# Patient Record
Sex: FEMALE | Race: WHITE | NOT HISPANIC OR LATINO | Employment: OTHER | URBAN - NONMETROPOLITAN AREA
[De-identification: names, ages, dates, MRNs, and addresses within clinical notes are randomized per-mention and may not be internally consistent; named-entity substitution may affect disease eponyms.]

---

## 2021-06-03 ENCOUNTER — HOSPITAL ENCOUNTER (EMERGENCY)
Facility: HOSPITAL | Age: 81
Discharge: HOME | End: 2021-06-03
Attending: EMERGENCY MEDICINE | Admitting: EMERGENCY MEDICINE
Payer: COMMERCIAL

## 2021-06-03 ENCOUNTER — APPOINTMENT (EMERGENCY)
Dept: CT IMAGING | Facility: HOSPITAL | Age: 81
End: 2021-06-03
Payer: COMMERCIAL

## 2021-06-03 VITALS
DIASTOLIC BLOOD PRESSURE: 79 MMHG | TEMPERATURE: 97.5 F | SYSTOLIC BLOOD PRESSURE: 172 MMHG | WEIGHT: 179.68 LBS | HEART RATE: 60 BPM | RESPIRATION RATE: 30 BRPM | OXYGEN SATURATION: 96 %

## 2021-06-03 DIAGNOSIS — R42 VERTIGO: Primary | ICD-10-CM

## 2021-06-03 LAB
ALBUMIN SERPL BCP-MCNC: 3.1 G/DL (ref 3.5–5)
ALP SERPL-CCNC: 84 U/L (ref 46–116)
ALT SERPL W P-5'-P-CCNC: 25 U/L (ref 12–78)
ANION GAP SERPL CALCULATED.3IONS-SCNC: 5 MMOL/L (ref 4–13)
APTT PPP: 44 SECONDS (ref 23–37)
AST SERPL W P-5'-P-CCNC: 12 U/L (ref 5–45)
BASOPHILS # BLD AUTO: 0.04 THOUSANDS/ΜL (ref 0–0.1)
BASOPHILS NFR BLD AUTO: 1 % (ref 0–1)
BILIRUB SERPL-MCNC: 0.68 MG/DL (ref 0.2–1)
BUN SERPL-MCNC: 16 MG/DL (ref 5–25)
CALCIUM ALBUM COR SERPL-MCNC: 9.3 MG/DL (ref 8.3–10.1)
CALCIUM SERPL-MCNC: 8.6 MG/DL (ref 8.3–10.1)
CHLORIDE SERPL-SCNC: 106 MMOL/L (ref 100–108)
CO2 SERPL-SCNC: 27 MMOL/L (ref 21–32)
CREAT SERPL-MCNC: 0.87 MG/DL (ref 0.6–1.3)
EOSINOPHIL # BLD AUTO: 0.33 THOUSAND/ΜL (ref 0–0.61)
EOSINOPHIL NFR BLD AUTO: 6 % (ref 0–6)
ERYTHROCYTE [DISTWIDTH] IN BLOOD BY AUTOMATED COUNT: 13.2 % (ref 11.6–15.1)
GFR SERPL CREATININE-BSD FRML MDRD: 64 ML/MIN/1.73SQ M
GLUCOSE SERPL-MCNC: 99 MG/DL (ref 65–140)
HCT VFR BLD AUTO: 40.5 % (ref 34.8–46.1)
HGB BLD-MCNC: 13.7 G/DL (ref 11.5–15.4)
IMM GRANULOCYTES # BLD AUTO: 0.02 THOUSAND/UL (ref 0–0.2)
IMM GRANULOCYTES NFR BLD AUTO: 0 % (ref 0–2)
INR PPP: 2.89 (ref 0.84–1.19)
LACTATE SERPL-SCNC: 1.1 MMOL/L (ref 0.5–2)
LIPASE SERPL-CCNC: 100 U/L (ref 73–393)
LYMPHOCYTES # BLD AUTO: 1.4 THOUSANDS/ΜL (ref 0.6–4.47)
LYMPHOCYTES NFR BLD AUTO: 24 % (ref 14–44)
MCH RBC QN AUTO: 30.9 PG (ref 26.8–34.3)
MCHC RBC AUTO-ENTMCNC: 33.8 G/DL (ref 31.4–37.4)
MCV RBC AUTO: 91 FL (ref 82–98)
MONOCYTES # BLD AUTO: 0.64 THOUSAND/ΜL (ref 0.17–1.22)
MONOCYTES NFR BLD AUTO: 11 % (ref 4–12)
NEUTROPHILS # BLD AUTO: 3.3 THOUSANDS/ΜL (ref 1.85–7.62)
NEUTS SEG NFR BLD AUTO: 58 % (ref 43–75)
NRBC BLD AUTO-RTO: 0 /100 WBCS
PLATELET # BLD AUTO: 308 THOUSANDS/UL (ref 149–390)
PMV BLD AUTO: 8.7 FL (ref 8.9–12.7)
POTASSIUM SERPL-SCNC: 4.1 MMOL/L (ref 3.5–5.3)
PROT SERPL-MCNC: 6.6 G/DL (ref 6.4–8.2)
PROTHROMBIN TIME: 29.6 SECONDS (ref 11.6–14.5)
RBC # BLD AUTO: 4.43 MILLION/UL (ref 3.81–5.12)
SARS-COV-2 RNA RESP QL NAA+PROBE: NEGATIVE
SODIUM SERPL-SCNC: 138 MMOL/L (ref 136–145)
TROPONIN I SERPL-MCNC: <0.02 NG/ML
WBC # BLD AUTO: 5.73 THOUSAND/UL (ref 4.31–10.16)

## 2021-06-03 PROCEDURE — 99284 EMERGENCY DEPT VISIT MOD MDM: CPT

## 2021-06-03 PROCEDURE — 93005 ELECTROCARDIOGRAM TRACING: CPT

## 2021-06-03 PROCEDURE — 96360 HYDRATION IV INFUSION INIT: CPT

## 2021-06-03 PROCEDURE — 70450 CT HEAD/BRAIN W/O DYE: CPT

## 2021-06-03 PROCEDURE — 85730 THROMBOPLASTIN TIME PARTIAL: CPT | Performed by: EMERGENCY MEDICINE

## 2021-06-03 PROCEDURE — 36415 COLL VENOUS BLD VENIPUNCTURE: CPT | Performed by: EMERGENCY MEDICINE

## 2021-06-03 PROCEDURE — U0003 INFECTIOUS AGENT DETECTION BY NUCLEIC ACID (DNA OR RNA); SEVERE ACUTE RESPIRATORY SYNDROME CORONAVIRUS 2 (SARS-COV-2) (CORONAVIRUS DISEASE [COVID-19]), AMPLIFIED PROBE TECHNIQUE, MAKING USE OF HIGH THROUGHPUT TECHNOLOGIES AS DESCRIBED BY CMS-2020-01-R: HCPCS | Performed by: EMERGENCY MEDICINE

## 2021-06-03 PROCEDURE — 99285 EMERGENCY DEPT VISIT HI MDM: CPT | Performed by: EMERGENCY MEDICINE

## 2021-06-03 PROCEDURE — 83690 ASSAY OF LIPASE: CPT | Performed by: EMERGENCY MEDICINE

## 2021-06-03 PROCEDURE — 83605 ASSAY OF LACTIC ACID: CPT | Performed by: EMERGENCY MEDICINE

## 2021-06-03 PROCEDURE — U0005 INFEC AGEN DETEC AMPLI PROBE: HCPCS | Performed by: EMERGENCY MEDICINE

## 2021-06-03 PROCEDURE — 85025 COMPLETE CBC W/AUTO DIFF WBC: CPT | Performed by: EMERGENCY MEDICINE

## 2021-06-03 PROCEDURE — 84484 ASSAY OF TROPONIN QUANT: CPT | Performed by: EMERGENCY MEDICINE

## 2021-06-03 PROCEDURE — 85610 PROTHROMBIN TIME: CPT | Performed by: EMERGENCY MEDICINE

## 2021-06-03 PROCEDURE — 80053 COMPREHEN METABOLIC PANEL: CPT | Performed by: EMERGENCY MEDICINE

## 2021-06-03 RX ORDER — ONDANSETRON 2 MG/ML
4 INJECTION INTRAMUSCULAR; INTRAVENOUS ONCE
Status: DISCONTINUED | OUTPATIENT
Start: 2021-06-03 | End: 2021-06-03 | Stop reason: HOSPADM

## 2021-06-03 RX ORDER — LEVOTHYROXINE SODIUM 0.05 MG/1
50 TABLET ORAL DAILY
COMMUNITY

## 2021-06-03 RX ORDER — MECLIZINE HYDROCHLORIDE 25 MG/1
25 TABLET ORAL 3 TIMES DAILY PRN
Qty: 30 TABLET | Refills: 0 | Status: SHIPPED | OUTPATIENT
Start: 2021-06-03

## 2021-06-03 RX ORDER — CEFUROXIME AXETIL 500 MG/1
500 TABLET ORAL EVERY 12 HOURS SCHEDULED
Qty: 14 TABLET | Refills: 0 | Status: SHIPPED | OUTPATIENT
Start: 2021-06-03 | End: 2021-06-10

## 2021-06-03 RX ADMIN — SODIUM CHLORIDE 500 ML: 0.9 INJECTION, SOLUTION INTRAVENOUS at 16:13

## 2021-06-03 NOTE — ED PROVIDER NOTES
History  Chief Complaint   Patient presents with    Dizziness     Patient with dizziness and nausea x3 days  C/O diarrhea and "runny nose "       Patient for the past 3 days complains of dizziness and nausea  States it occurs at night when she lays in bed and turns it during bed  States she gets dizzy  Slightly nauseated  Has had cold symptoms recently  Has had diarrhea  Received both COVID vaccine shots in April  Called her PCP today he referred to the ED for further evaluation  Told her she had COVID symptoms  No fevers or chills  No rash  No shortness of breath  No vision changes  No change in speech  No focal weakness or numbness  History provided by:  Patient   used: No    Dizziness  Quality:  Vertigo  Severity:  Mild  Onset quality:  Sudden  Duration:  3 days  Timing:  Intermittent  Progression:  Unchanged  Chronicity:  New  Context comment:  Occurs with turning in bed  Relieved by:  Being still  Exacerbated by: Turning in bed  Ineffective treatments:  None tried  Associated symptoms: no blood in stool, no chest pain, no diarrhea, no headaches, no hearing loss, no nausea, no palpitations, no shortness of breath, no vision changes and no vomiting        Prior to Admission Medications   Prescriptions Last Dose Informant Patient Reported? Taking? Warfarin Sodium (COUMADIN PO)   Yes Yes   Sig: Take by mouth   levothyroxine 50 mcg tablet   Yes Yes   Sig: Take 50 mcg by mouth daily   metoprolol tartrate (LOPRESSOR) 25 mg tablet   Yes Yes   Sig: Take 25 mg by mouth daily      Facility-Administered Medications: None       Past Medical History:   Diagnosis Date    Anxiety     Hypercholesteremia     [facility]anoma ([facility] )     MI (myocardial infarction) ([facility] )     Stroke [facility])        Past Surgical History:   Procedure Laterality Date    THYROIDECTOMY         History reviewed  No pertinent family history    I have reviewed and agree with the history as documented  E-Cigarette/Vaping    E-Cigarette Use Never User      E-Cigarette/Vaping Substances     Social History     Tobacco Use    Smoking status: Never Smoker    Smokeless tobacco: Never Used   Substance Use Topics    Alcohol use: Not Currently    Drug use: Never       Review of Systems   Constitutional: Negative for chills and fever  HENT: Negative for ear pain, hearing loss, sore throat, trouble swallowing and voice change  Eyes: Negative for pain and discharge  Respiratory: Negative for cough, shortness of breath and wheezing  Cardiovascular: Negative for chest pain and palpitations  Gastrointestinal: Negative for abdominal pain, blood in stool, constipation, diarrhea, nausea and vomiting  Genitourinary: Negative for dysuria, flank pain, frequency and hematuria  Musculoskeletal: Negative for joint swelling, neck pain and neck stiffness  Skin: Negative for rash and wound  Neurological: Positive for dizziness  Negative for seizures, syncope, facial asymmetry and headaches  Psychiatric/Behavioral: Negative for hallucinations, self-injury and suicidal ideas  All other systems reviewed and are negative  Physical Exam  Physical Exam  Vitals signs and nursing note reviewed  Constitutional:       General: She is not in acute distress  Appearance: She is well-developed  HENT:      Head: Normocephalic and atraumatic  Right Ear: External ear normal       Left Ear: External ear normal    Eyes:      Conjunctiva/sclera: Conjunctivae normal       Pupils: Pupils are equal, round, and reactive to light  Neck:      Musculoskeletal: Normal range of motion and neck supple  Cardiovascular:      Rate and Rhythm: Normal rate and regular rhythm  Heart sounds: Normal heart sounds  No murmur  Pulmonary:      Effort: Pulmonary effort is normal       Breath sounds: Normal breath sounds  No wheezing or rales     Abdominal:      General: Bowel sounds are normal  There is no distension  Palpations: Abdomen is soft  Tenderness: There is no abdominal tenderness  There is no guarding or rebound  Musculoskeletal: Normal range of motion  General: No deformity  Skin:     General: Skin is warm and dry  Findings: No rash  Neurological:      General: No focal deficit present  Mental Status: She is alert and oriented to person, place, and time  Cranial Nerves: No cranial nerve deficit  Psychiatric:         Behavior: Behavior normal          Vital Signs  ED Triage Vitals [06/03/21 1545]   Temperature Pulse Respirations Blood Pressure SpO2   97 5 °F (36 4 °C) 70 18 162/82 96 %      Temp Source Heart Rate Source Patient Position - Orthostatic VS BP Location FiO2 (%)   Temporal Monitor Sitting Right arm --      Pain Score       --           Vitals:    06/03/21 1545   BP: 162/82   Pulse: 70   Patient Position - Orthostatic VS: Sitting         Visual Acuity      ED Medications  Medications   ondansetron (ZOFRAN) injection 4 mg (has no administration in time range)   sodium chloride 0 9 % bolus 500 mL (500 mL Intravenous New Bag 6/3/21 1613)       Diagnostic Studies  Results Reviewed     Procedure Component Value Units Date/Time    Novel Coronavirus (Covid-19),PCR [facility] - 2 Hour Stat [[id number]]  (Normal) Collected: 06/03/21 1611    Lab Status: Final result Specimen: Nares from Nose Updated: 06/03/21 1714     SARS-CoV-2 Negative    Narrative: The specimen collection materials, transport medium, and/or testing methodology utilized in the production of these test results have been proven to be reliable in a limited validation with an abbreviated program under the Emergency Utilization Authorization provided by the FDA  Testing reported as "Presumptive positive" will be confirmed with secondary testing to ensure result accuracy    Clinical caution and judgement should be used with the interpretation of these results with consideration of the clinical impression and other laboratory testing  Testing reported as "Positive" or "Negative" has been proven to be accurate according to standard laboratory validation requirements  All testing is performed with control materials showing appropriate reactivity at standard intervals  Lactic acid [[id number]]  (Normal) Collected: 06/03/21 1611    Lab Status: Final result Specimen: Blood from Arm, Right Updated: 06/03/21 1646     LACTIC ACID 1 1 mmol/L     Narrative:      Result may be elevated if tourniquet was used during collection      Comprehensive metabolic panel [[id number]]  (Abnormal) Collected: 06/03/21 1611    Lab Status: Final result Specimen: Blood from Arm, Right Updated: 06/03/21 1640     Sodium 138 mmol/L      Potassium 4 1 mmol/L      Chloride 106 mmol/L      CO2 27 mmol/L      ANION GAP 5 mmol/L      BUN 16 mg/dL      Creatinine 0 87 mg/dL      Glucose 99 mg/dL      Calcium 8 6 mg/dL      Corrected Calcium 9 3 mg/dL      AST 12 U/L      ALT 25 U/L      Alkaline Phosphatase 84 U/L      Total Protein 6 6 g/dL      Albumin 3 1 g/dL      Total Bilirubin 0 68 mg/dL      eGFR 64 ml/min/1 73sq m     Narrative:      Alycia guidelines for Chronic Kidney Disease (CKD):     Stage 1 with normal or high GFR (GFR > 90 mL/min/1 73 square meters)    Stage 2 Mild CKD (GFR = 60-89 mL/min/1 73 square meters)    Stage 3A Moderate CKD (GFR = 45-59 mL/min/1 73 square meters)    Stage 3B Moderate CKD (GFR = 30-44 mL/min/1 73 square meters)    Stage 4 Severe CKD (GFR = 15-29 mL/min/1 73 square meters)    Stage 5 End Stage CKD (GFR <15 mL/min/1 73 square meters)  Note: GFR calculation is accurate only with a steady state creatinine    Lipase [[id number]]  (Normal) Collected: 06/03/21 1611    Lab Status: Final result Specimen: Blood from Arm, Right Updated: 06/03/21 1640     Lipase 100 u/L     Troponin I [[id number]]  (Normal) Collected: 06/03/21 1611    Lab Status: Final result Specimen: Blood from Arm, Right Updated: 06/03/21 1640     Troponin I <0 02 ng/mL     Protime-INR [[id number]]  (Abnormal) Collected: 06/03/21 1611    Lab Status: Final result Specimen: Blood from Arm, Right Updated: 06/03/21 1638     Protime 29 6 seconds      INR 2 89    APTT [[id number]]  (Abnormal) Collected: 06/03/21 1611    Lab Status: Final result Specimen: Blood from Arm, Right Updated: 06/03/21 1638     PTT 44 seconds     CBC and differential [[id number]]  (Abnormal) Collected: 06/03/21 1611    Lab Status: Final result Specimen: Blood from Arm, Right Updated: 06/03/21 1622     WBC 5 73 Thousand/uL      RBC 4 43 Million/uL      Hemoglobin 13 7 g/dL      Hematocrit 40 5 %      MCV 91 fL      MCH 30 9 pg      MCHC 33 8 g/dL      RDW 13 2 %      MPV 8 7 fL      Platelets 342 Thousands/uL      nRBC 0 /100 WBCs      Neutrophils Relative 58 %      Immat GRANS % 0 %      Lymphocytes Relative 24 %      Monocytes Relative 11 %      Eosinophils Relative 6 %      Basophils Relative 1 %      Neutrophils Absolute 3 30 Thousands/µL      Immature Grans Absolute 0 02 Thousand/uL      Lymphocytes Absolute 1 40 Thousands/µL      Monocytes Absolute 0 64 Thousand/µL      Eosinophils Absolute 0 33 Thousand/µL      Basophils Absolute 0 04 Thousands/µL                  CT head without contrast   Final Result by Starr Luis MD (06/03 1724)      No acute intracranial abnormality                    Workstation performed: [id number]                    Procedures  ECG 12 Lead Documentation Only    Date/Time: 6/3/2021 5:28 PM  Performed by: Hima Alexander MD  Authorized by: Hima Alexander MD     ECG reviewed by me, the ED Provider: yes    Patient location:  ED  Previous ECG:     Previous ECG:  Unavailable  Rate:     ECG rate:  60  Rhythm:     Rhythm: sinus rhythm    Ectopy:     Ectopy: none    QRS:     QRS axis:  Left             ED Course                                           MDM  Number of Diagnoses or Management Options  Vertigo:   Diagnosis management comments: Neurologic exam is benign  CT scan is normal   Symptoms are reproducible when she turns in bed  Most likely vertigo  Will cover with antibiotics just in case this is an early your infection  White blood cell count is normal   No change in mental status  No weakness or numbness in arm or leg  Amount and/or Complexity of Data Reviewed  Clinical lab tests: reviewed  Review and summarize past medical records: yes        Disposition  Final diagnoses:   Vertigo     Time reflects when diagnosis was documented in both MDM as applicable and the Disposition within this note     Time User Action Codes Description Comment    6/3/2021  4:44 PM Tess Marti Add [R42] Vertigo       ED Disposition     ED Disposition Condition Date/Time Comment    Discharge Stable Thu Drew 3, 2021  4:44 PM Ada Ricardo Parulis discharge to home/self care  Follow-up Information     Follow up With Specialties Details Why [address], [place] Family Medicine Call in 1 week As needed [address] [place] [address]  [phone]            Patient's Medications   Discharge Prescriptions    CEFUROXIME (CEFTIN) 500 MG TABLET    Take 1 tablet (500 mg total) by mouth every 12 (twelve) hours for 7 days       Start Date: 6/3/2021  End Date: 6/10/2021       Order Dose: 500 mg       Quantity: 14 tablet    Refills: 0    MECLIZINE (ANTIVERT) 25 MG TABLET    Take 1 tablet (25 mg total) by mouth 3 (three) times a day as needed for dizziness       Start Date: 6/3/2021  End Date: --       Order Dose: 25 mg       Quantity: 30 tablet    Refills: 0     No discharge procedures on file      PDMP Review     None          ED Provider  Electronically Signed by           Radha Sims MD  06/03/21 1579 [facility] Zion Waldrop MD  06/03/21 3365

## 2021-06-04 LAB
ATRIAL RATE: 57 BPM
P AXIS: 56 DEGREES
PR INTERVAL: 166 MS
QRS AXIS: -35 DEGREES
QRSD INTERVAL: 80 MS
QT INTERVAL: 428 MS
QTC INTERVAL: 416 MS
T WAVE AXIS: 42 DEGREES
VENTRICULAR RATE: 57 BPM

## 2021-12-30 ENCOUNTER — OFFICE VISIT (OUTPATIENT)
Dept: URGENT CARE | Facility: CLINIC | Age: 81
End: 2021-12-30
Payer: COMMERCIAL

## 2021-12-30 VITALS
HEART RATE: 60 BPM | BODY MASS INDEX: 30.11 KG/M2 | OXYGEN SATURATION: 97 % | TEMPERATURE: 97.5 F | RESPIRATION RATE: 20 BRPM | WEIGHT: 176.4 LBS | HEIGHT: 64 IN

## 2021-12-30 DIAGNOSIS — K12.0 APHTHOUS ULCER: Primary | ICD-10-CM

## 2021-12-30 PROCEDURE — 99214 OFFICE O/P EST MOD 30 MIN: CPT | Performed by: PHYSICIAN ASSISTANT

## 2021-12-30 PROCEDURE — S9088 SERVICES PROVIDED IN URGENT: HCPCS | Performed by: PHYSICIAN ASSISTANT

## 2021-12-30 RX ORDER — TRIAMCINOLONE ACETONIDE 1 MG/G
CREAM TOPICAL 2 TIMES DAILY
Qty: 30 G | Refills: 0 | Status: SHIPPED | OUTPATIENT
Start: 2021-12-30

## 2022-08-06 ENCOUNTER — APPOINTMENT (EMERGENCY)
Dept: RADIOLOGY | Facility: HOSPITAL | Age: 82
End: 2022-08-06
Payer: COMMERCIAL

## 2022-08-06 ENCOUNTER — APPOINTMENT (EMERGENCY)
Dept: CT IMAGING | Facility: HOSPITAL | Age: 82
End: 2022-08-06
Payer: COMMERCIAL

## 2022-08-06 ENCOUNTER — HOSPITAL ENCOUNTER (EMERGENCY)
Facility: HOSPITAL | Age: 82
Discharge: HOME | End: 2022-08-06
Attending: EMERGENCY MEDICINE | Admitting: EMERGENCY MEDICINE
Payer: COMMERCIAL

## 2022-08-06 VITALS
OXYGEN SATURATION: 97 % | TEMPERATURE: 97.7 F | SYSTOLIC BLOOD PRESSURE: 160 MMHG | BODY MASS INDEX: 29.88 KG/M2 | HEART RATE: 60 BPM | RESPIRATION RATE: 17 BRPM | WEIGHT: 175 LBS | DIASTOLIC BLOOD PRESSURE: 78 MMHG | HEIGHT: 64 IN

## 2022-08-06 DIAGNOSIS — J06.9 URI (UPPER RESPIRATORY INFECTION): ICD-10-CM

## 2022-08-06 DIAGNOSIS — M54.9 BACK PAIN: ICD-10-CM

## 2022-08-06 DIAGNOSIS — R10.13 EPIGASTRIC PAIN: Primary | ICD-10-CM

## 2022-08-06 LAB
ALBUMIN SERPL BCP-MCNC: 3.5 G/DL (ref 3.5–5)
ALP SERPL-CCNC: 82 U/L (ref 46–116)
ALT SERPL W P-5'-P-CCNC: 25 U/L (ref 12–78)
ANION GAP SERPL CALCULATED.3IONS-SCNC: 9 MMOL/L (ref 4–13)
AST SERPL W P-5'-P-CCNC: 19 U/L (ref 5–45)
BASOPHILS # BLD AUTO: 0.04 THOUSANDS/ΜL (ref 0–0.1)
BASOPHILS NFR BLD AUTO: 1 % (ref 0–1)
BILIRUB SERPL-MCNC: 0.67 MG/DL (ref 0.2–1)
BUN SERPL-MCNC: 20 MG/DL (ref 5–25)
CALCIUM SERPL-MCNC: 8.6 MG/DL (ref 8.3–10.1)
CARDIAC TROPONIN I PNL SERPL HS: 4 NG/L
CHLORIDE SERPL-SCNC: 103 MMOL/L (ref 96–108)
CO2 SERPL-SCNC: 26 MMOL/L (ref 21–32)
CREAT SERPL-MCNC: 1.23 MG/DL (ref 0.6–1.3)
EOSINOPHIL # BLD AUTO: 0.22 THOUSAND/ΜL (ref 0–0.61)
EOSINOPHIL NFR BLD AUTO: 4 % (ref 0–6)
ERYTHROCYTE [DISTWIDTH] IN BLOOD BY AUTOMATED COUNT: 13.5 % (ref 11.6–15.1)
FLUAV RNA RESP QL NAA+PROBE: NEGATIVE
FLUBV RNA RESP QL NAA+PROBE: NEGATIVE
GFR SERPL CREATININE-BSD FRML MDRD: 41 ML/MIN/1.73SQ M
GLUCOSE SERPL-MCNC: 92 MG/DL (ref 65–140)
HCT VFR BLD AUTO: 42.5 % (ref 34.8–46.1)
HGB BLD-MCNC: 13.8 G/DL (ref 11.5–15.4)
IMM GRANULOCYTES # BLD AUTO: 0.01 THOUSAND/UL (ref 0–0.2)
IMM GRANULOCYTES NFR BLD AUTO: 0 % (ref 0–2)
LIPASE SERPL-CCNC: 121 U/L (ref 73–393)
LYMPHOCYTES # BLD AUTO: 1.82 THOUSANDS/ΜL (ref 0.6–4.47)
LYMPHOCYTES NFR BLD AUTO: 29 % (ref 14–44)
MCH RBC QN AUTO: 30.1 PG (ref 26.8–34.3)
MCHC RBC AUTO-ENTMCNC: 32.5 G/DL (ref 31.4–37.4)
MCV RBC AUTO: 93 FL (ref 82–98)
MONOCYTES # BLD AUTO: 0.68 THOUSAND/ΜL (ref 0.17–1.22)
MONOCYTES NFR BLD AUTO: 11 % (ref 4–12)
NEUTROPHILS # BLD AUTO: 3.53 THOUSANDS/ΜL (ref 1.85–7.62)
NEUTS SEG NFR BLD AUTO: 55 % (ref 43–75)
NRBC BLD AUTO-RTO: 0 /100 WBCS
PLATELET # BLD AUTO: 321 THOUSANDS/UL (ref 149–390)
PMV BLD AUTO: 9 FL (ref 8.9–12.7)
POTASSIUM SERPL-SCNC: 4.4 MMOL/L (ref 3.5–5.3)
PROT SERPL-MCNC: 7 G/DL (ref 6.4–8.4)
RBC # BLD AUTO: 4.58 MILLION/UL (ref 3.81–5.12)
RSV RNA RESP QL NAA+PROBE: NEGATIVE
SARS-COV-2 RNA RESP QL NAA+PROBE: NEGATIVE
SODIUM SERPL-SCNC: 138 MMOL/L (ref 135–147)
WBC # BLD AUTO: 6.3 THOUSAND/UL (ref 4.31–10.16)

## 2022-08-06 PROCEDURE — 99285 EMERGENCY DEPT VISIT HI MDM: CPT

## 2022-08-06 PROCEDURE — 83690 ASSAY OF LIPASE: CPT | Performed by: EMERGENCY MEDICINE

## 2022-08-06 PROCEDURE — 96374 THER/PROPH/DIAG INJ IV PUSH: CPT

## 2022-08-06 PROCEDURE — 0241U HB NFCT DS VIR RESP RNA 4 TRGT: CPT | Performed by: EMERGENCY MEDICINE

## 2022-08-06 PROCEDURE — 80053 COMPREHEN METABOLIC PANEL: CPT | Performed by: EMERGENCY MEDICINE

## 2022-08-06 PROCEDURE — 74176 CT ABD & PELVIS W/O CONTRAST: CPT

## 2022-08-06 PROCEDURE — 71046 X-RAY EXAM CHEST 2 VIEWS: CPT

## 2022-08-06 PROCEDURE — 84484 ASSAY OF TROPONIN QUANT: CPT | Performed by: EMERGENCY MEDICINE

## 2022-08-06 PROCEDURE — 99284 EMERGENCY DEPT VISIT MOD MDM: CPT | Performed by: EMERGENCY MEDICINE

## 2022-08-06 PROCEDURE — 36415 COLL VENOUS BLD VENIPUNCTURE: CPT | Performed by: EMERGENCY MEDICINE

## 2022-08-06 PROCEDURE — 93005 ELECTROCARDIOGRAM TRACING: CPT

## 2022-08-06 PROCEDURE — 85025 COMPLETE CBC W/AUTO DIFF WBC: CPT | Performed by: EMERGENCY MEDICINE

## 2022-08-06 RX ORDER — FAMOTIDINE 10 MG/ML
20 INJECTION, SOLUTION INTRAVENOUS ONCE
Status: COMPLETED | OUTPATIENT
Start: 2022-08-06 | End: 2022-08-06

## 2022-08-06 RX ADMIN — FAMOTIDINE 20 MG: 10 INJECTION, SOLUTION INTRAVENOUS at 22:14

## 2022-08-07 NOTE — ED PROVIDER NOTES
History  Chief Complaint   Patient presents with    Abdominal Pain     Pt offers multiple complaints  Started yesterday with upper abdominal pain - states it is "sore" and hurts when she moves around  Also reports pain in the top of her right shoulder along with muscles aches for the past few days  Took Gas Ex with mild relief  History provided by:  Medical records and patient  Abdominal Pain  Pain location:  Epigastric  Pain quality: aching    Pain radiates to:  Does not radiate  Pain severity:  Mild  Onset quality:  Gradual  Duration:  2 days  Timing:  Intermittent  Progression:  Waxing and waning  Chronicity:  New  Context comment:  Two days of intermittent epigastric pain, worse with coming to a sitting position, otherwise at rest pain is resolved  Patient noted some upper back pain earlier today that has resolved  Relieved by:  Nothing  Worsened by: Movement  Ineffective treatments:  None tried  Associated symptoms: no chest pain, no chills, no cough, no diarrhea, no dysuria, no fatigue, no fever, no hematuria, no nausea, no shortness of breath, no sore throat and no vomiting    Associated symptoms comment:  Patient states 2 weeks ago she had flu like symptoms, she is concerned that her symptoms could be from [facility], requested to be tested      Prior to Admission Medications   Prescriptions Last Dose Informant Patient Reported? Taking?    Warfarin Sodium (COUMADIN PO)   Yes No   Sig: Take by mouth   al mag oxide-diphenhydramine-lidocaine viscous (MAGIC MOUTHWASH) 1:1:1 suspension   No No   Sig: Swish and spit 10 mL every 4 (four) hours as needed for mouth pain or discomfort   levothyroxine 50 mcg tablet   Yes No   Sig: Take 50 mcg by mouth daily   meclizine (ANTIVERT) 25 mg tablet   No No   Sig: Take 1 tablet (25 mg total) by mouth 3 (three) times a day as needed for dizziness   Patient not taking: Reported on 12/30/2021    metoprolol tartrate (LOPRESSOR) 25 mg tablet   Yes No   Sig: Take 25 mg by mouth daily   triamcinolone (KENALOG) 0 1 % cream   No No   Sig: Apply topically 2 (two) times a day until healed  Facility-Administered Medications: None       Past Medical History:   Diagnosis Date    Anxiety     Fibromyalgia     Hypercholesteremia     [facility]anoma ([facility] )     MI (myocardial infarction) ([facility] )     Stroke [facility])        Past Surgical History:   Procedure Laterality Date    APPENDECTOMY      CHOLECYSTECTOMY      THYROIDECTOMY         History reviewed  No pertinent family history  I have reviewed and agree with the history as documented  E-Cigarette/Vaping    E-Cigarette Use Never User      E-Cigarette/Vaping Substances     Social History     Tobacco Use    Smoking status: Never Smoker    Smokeless tobacco: Never Used   Vaping Use    Vaping Use: Never used   Substance Use Topics    Alcohol use: Not Currently    Drug use: Never       Review of Systems   Constitutional: Negative for chills, fatigue and fever  HENT: Negative for ear discharge, ear pain, rhinorrhea and sore throat  Eyes: Negative for pain and visual disturbance  Respiratory: Negative for cough and shortness of breath  Cardiovascular: Negative for chest pain and palpitations  Gastrointestinal: Positive for abdominal pain  Negative for diarrhea, nausea and vomiting  Endocrine: Negative for polydipsia, polyphagia and polyuria  Genitourinary: Negative for difficulty urinating, dysuria, flank pain and hematuria  Musculoskeletal: Negative for arthralgias and back pain  Skin: Negative for color change and rash  Allergic/Immunologic: Negative for immunocompromised state  Neurological: Negative for dizziness, seizures, syncope, weakness and headaches  Psychiatric/Behavioral: Negative for confusion and self-injury  The patient is not nervous/anxious  All other systems reviewed and are negative  Physical Exam  Physical Exam  Constitutional:       General: She is not in acute distress       Appearance: Normal appearance  She is not ill-appearing, toxic-appearing or diaphoretic  HENT:      Head: Normocephalic and atraumatic  Nose: Nose normal  No congestion or rhinorrhea  Mouth/Throat:      Mouth: Mucous membranes are moist       Pharynx: Oropharynx is clear  No oropharyngeal exudate or posterior oropharyngeal erythema  Eyes:      General:         Right eye: No discharge  Left eye: No discharge  Cardiovascular:      Rate and Rhythm: Normal rate and regular rhythm  Pulses: Normal pulses  Heart sounds: Normal heart sounds  No murmur heard  No gallop  Pulmonary:      Effort: Pulmonary effort is normal  No respiratory distress  Breath sounds: Normal breath sounds  No stridor  No wheezing, rhonchi or rales  Chest:      Chest wall: No tenderness  Abdominal:      General: Bowel sounds are normal  There is no distension  Palpations: Abdomen is soft  There is no mass  Tenderness: There is no abdominal tenderness  There is no right CVA tenderness, left CVA tenderness, guarding or rebound  Hernia: No hernia is present  Musculoskeletal:         General: Normal range of motion  Cervical back: Normal range of motion and neck supple  Skin:     General: Skin is warm and dry  Capillary Refill: Capillary refill takes less than 2 seconds  Neurological:      General: No focal deficit present  Mental Status: She is alert and oriented to person, place, and time  Cranial Nerves: No cranial nerve deficit  Sensory: No sensory deficit  Motor: No weakness  Coordination: Coordination normal       Gait: Gait normal       Deep Tendon Reflexes: Reflexes normal    Psychiatric:         Mood and Affect: Mood normal          Behavior: Behavior normal          Thought Content:  Thought content normal          Judgment: Judgment normal          Vital Signs  ED Triage Vitals [08/06/22 2130]   Temperature Pulse Respirations Blood Pressure SpO2   97 7 °F (36 5 °C) 63 17 160/78 97 %      Temp Source Heart Rate Source Patient Position - Orthostatic VS BP Location FiO2 (%)   Temporal -- Lying Right arm --      Pain Score       No Pain           Vitals:    08/06/22 2130 08/06/22 2145   BP: 160/78    Pulse: 63 60   Patient Position - Orthostatic VS: Lying          Visual Acuity      ED Medications  Medications   Famotidine (PF) (PEPCID) injection 20 mg (20 mg Intravenous Given 8/6/22 2214)       Diagnostic Studies  Results Reviewed     Procedure Component Value Units Date/Time    FLU/RSV/COVID - if FLU/RSV clinically relevant [[id number]]  (Normal) Collected: 08/06/22 2202    Lab Status: Final result Specimen: Nares from Nose Updated: 08/06/22 2255     SARS-CoV-2 Negative     INFLUENZA A PCR Negative     INFLUENZA B PCR Negative     RSV PCR Negative    Narrative:      FOR PEDIATRIC PATIENTS - copy/paste COVID Guidelines URL to browser: [email or web address]    SARS-CoV-2 assay is a Nucleic Acid Amplification assay intended for the  qualitative detection of nucleic acid from SARS-CoV-2 in nasopharyngeal  swabs  Results are for the presumptive identification of SARS-CoV-2 RNA  Positive results are indicative of infection with SARS-CoV-2, the virus  causing COVID-19, but do not rule out bacterial infection or co-infection  with other viruses  Laboratories within the [place] and its  territories are required to report all positive results to the appropriate  public health authorities  Negative results do not preclude SARS-CoV-2  infection and should not be used as the sole basis for treatment or other  patient management decisions  Negative results must be combined with  clinical observations, patient history, and epidemiological information  This test has not been FDA cleared or approved  This test has been authorized by FDA under an Emergency Use Authorization  (EUA)   This test is only authorized for the duration of time the  declaration that circumstances exist justifying the authorization of the  emergency use of an in vitro diagnostic tests for detection of SARS-CoV-2  virus and/or diagnosis of COVID-19 infection under section 564(b)(1) of  the Act, 21 U  S C  041BSM-0(H)(0), unless the authorization is terminated  or revoked sooner  The test has been validated but independent review by FDA  and CLIA is pending  Test performed using [employer] GeneXpert: This RT-PCR assay targets N2,  a region unique to SARS-CoV-2  A conserved region in the E-gene was chosen  for pan-Sarbecovirus detection which includes SARS-CoV-2      HS Troponin 0hr (reflex protocol) [[id number]]  (Normal) Collected: 08/06/22 2202    Lab Status: Final result Specimen: Blood from Arm, Left Updated: 08/06/22 2240     hs TnI 0hr 4 ng/L     Comprehensive metabolic panel [[id number]] Collected: 08/06/22 2202    Lab Status: Final result Specimen: Blood from Arm, Left Updated: 08/06/22 2233     Sodium 138 mmol/L      Potassium 4 4 mmol/L      Chloride 103 mmol/L      CO2 26 mmol/L      ANION GAP 9 mmol/L      BUN 20 mg/dL      Creatinine 1 23 mg/dL      Glucose 92 mg/dL      Calcium 8 6 mg/dL      AST 19 U/L      ALT 25 U/L      Alkaline Phosphatase 82 U/L      Total Protein 7 0 g/dL      Albumin 3 5 g/dL      Total Bilirubin 0 67 mg/dL      eGFR 41 ml/min/1 73sq m     Narrative:      Alycia guidelines for Chronic Kidney Disease (CKD):     Stage 1 with normal or high GFR (GFR > 90 mL/min/1 73 square meters)    Stage 2 Mild CKD (GFR = 60-89 mL/min/1 73 square meters)    Stage 3A Moderate CKD (GFR = 45-59 mL/min/1 73 square meters)    Stage 3B Moderate CKD (GFR = 30-44 mL/min/1 73 square meters)    Stage 4 Severe CKD (GFR = 15-29 mL/min/1 73 square meters)    Stage 5 End Stage CKD (GFR <15 mL/min/1 73 square meters)  Note: GFR calculation is accurate only with a steady state creatinine    Lipase [[id number]]  (Normal) Collected: 08/06/22 2202    Lab Status: Final result Specimen: Blood from Arm, Left Updated: 08/06/22 2233     Lipase 121 u/L     CBC and differential [[id number]] Collected: 08/06/22 2202    Lab Status: Final result Specimen: Blood from Arm, Left Updated: 08/06/22 2218     WBC 6 30 Thousand/uL      RBC 4 58 Million/uL      Hemoglobin 13 8 g/dL      Hematocrit 42 5 %      MCV 93 fL      MCH 30 1 pg      MCHC 32 5 g/dL      RDW 13 5 %      MPV 9 0 fL      Platelets 151 Thousands/uL      nRBC 0 /100 WBCs      Neutrophils Relative 55 %      Immat GRANS % 0 %      Lymphocytes Relative 29 %      Monocytes Relative 11 %      Eosinophils Relative 4 %      Basophils Relative 1 %      Neutrophils Absolute 3 53 Thousands/µL      Immature Grans Absolute 0 01 Thousand/uL      Lymphocytes Absolute 1 82 Thousands/µL      Monocytes Absolute 0 68 Thousand/µL      Eosinophils Absolute 0 22 Thousand/µL      Basophils Absolute 0 04 Thousands/µL                  CT abdomen pelvis wo contrast   Final Result by Tatiana Engle MD (08/06 2226)      No acute intra-abdominal abnormality  No free air or free fluid  Scattered colonic diverticulosis with no inflammatory changes present to suggest acute diverticulitis  Workstation performed: [id number]         XR chest 2 views    (Results Pending)              Procedures  Procedures         ED Course  ED Course as of 08/07/22 0331   Sat Aug 06, 2022   2118 2118:  Patient appears well, vital signs reviewed  Benign abdominal exam, however given acute onset, age plan to complete basic labs including lipase, urinalysis, CT abdomen pelvis  Check EKG and cardiac enzymes  I will give Pepcid for discomfort and re-evaluate  2230 2230:  CT, chest x-ray and labs reviewed  Pain well controlled  Stable for discharge                                 SBIRT 22yo+    Flowsheet Row Most Recent Value   SBIRT (25 yo +)    In order to provide better care to our patients, we are screening all of our patients for alcohol and drug use  Would it be okay to ask you these screening questions? Yes Filed at: 08/06/2022 2139   Initial Alcohol Screen: US AUDIT-C     1  How often do you have a drink containing alcohol? 0 Filed at: 08/06/2022 2139   2  How many drinks containing alcohol do you have on a typical day you are drinking? 0 Filed at: 08/06/2022 2139   3a  Male UNDER 65: How often do you have five or more drinks on one occasion? 0 Filed at: 08/06/2022 2139   3b  FEMALE Any Age, or MALE 65+: How often do you have 4 or more drinks on one occassion? 0 Filed at: 08/06/2022 2139   Audit-C Score 0 Filed at: 08/06/2022 2139   ANAIS: How many times in the past year have you    Used an illegal drug or used a prescription medication for non-medical reasons? Never Filed at: 08/06/2022 2139                    MDM    Disposition  Final diagnoses:   Epigastric pain   Back pain   URI (upper respiratory infection)     Time reflects when diagnosis was documented in both MDM as applicable and the Disposition within this note     Time User Action Codes Description Comment    8/6/2022 10:46 PM Kerry Nab [R10 13] Epigastric pain     8/6/2022 10:46 PM Kristy Hence Add [M54 9] Back pain     8/6/2022 10:46 PM Kristy Hence Add [J06 9] URI (upper respiratory infection)       ED Disposition     ED Disposition   Discharge    Condition   Stable    Date/Time   Sat Aug 6, 2022 10:46 PM    Comment   Edi Moreau discharge to home/self care                 Follow-up Information     Follow up With Specialties Details Why [address], [place] [facility] Schedule an appointment as soon as possible for a visit   [address] [facility] [address] [phone]            Discharge Medication List as of 8/6/2022 10:46 PM      CONTINUE these medications which have NOT CHANGED    Details   al mag oxide-diphenhydramine-lidocaine viscous (MAGIC MOUTHWASH) 1:1:1 suspension Swish and spit 10 mL every 4 (four) hours as needed for mouth pain or discomfort, Starting u 12/30/2021, Normal      levothyroxine 50 mcg tablet Take 50 mcg by mouth daily, Historical Med      meclizine (ANTIVERT) 25 mg tablet Take 1 tablet (25 mg total) by mouth 3 (three) times a day as needed for dizziness, Starting Thu 6/3/2021, Normal      metoprolol tartrate (LOPRESSOR) 25 mg tablet Take 25 mg by mouth daily, Historical Med      triamcinolone (KENALOG) 0 1 % cream Apply topically 2 (two) times a day until healed  , Starting Thu 12/30/2021, Normal      Warfarin Sodium (COUMADIN PO) Take by mouth, Historical Med             No discharge procedures on file      PDMP Review     None          ED Provider  Electronically Signed by           Tony Black MD  08/07/22 8989

## 2022-08-08 LAB
ATRIAL RATE: 53 BPM
P AXIS: 60 DEGREES
PR INTERVAL: 168 MS
QRS AXIS: -39 DEGREES
QRSD INTERVAL: 80 MS
QT INTERVAL: 440 MS
QTC INTERVAL: 412 MS
T WAVE AXIS: 44 DEGREES
VENTRICULAR RATE: 53 BPM

## 2023-04-04 ENCOUNTER — OFFICE VISIT (OUTPATIENT)
Dept: URGENT CARE | Facility: CLINIC | Age: 83
End: 2023-04-04

## 2023-04-04 VITALS
BODY MASS INDEX: 31.24 KG/M2 | HEART RATE: 68 BPM | DIASTOLIC BLOOD PRESSURE: 72 MMHG | TEMPERATURE: 98.2 F | OXYGEN SATURATION: 95 % | SYSTOLIC BLOOD PRESSURE: 144 MMHG | HEIGHT: 64 IN | RESPIRATION RATE: 18 BRPM | WEIGHT: 183 LBS

## 2023-04-04 DIAGNOSIS — K57.92 DIVERTICULITIS: Primary | ICD-10-CM

## 2023-04-04 DIAGNOSIS — M54.50 ACUTE BILATERAL LOW BACK PAIN WITHOUT SCIATICA: ICD-10-CM

## 2023-04-04 PROBLEM — F41.9 ANXIETY: Status: ACTIVE | Noted: 2018-04-13

## 2023-04-04 PROBLEM — K57.90 DIVERTICULOSIS OF INTESTINE WITHOUT BLEEDING: Status: ACTIVE | Noted: 2018-04-13

## 2023-04-04 PROBLEM — M81.0 AGE-RELATED OSTEOPOROSIS WITHOUT CURRENT PATHOLOGICAL FRACTURE: Status: ACTIVE | Noted: 2021-05-12

## 2023-04-04 PROBLEM — M48.061 SPINAL STENOSIS OF LUMBAR REGION: Status: ACTIVE | Noted: 2018-03-23

## 2023-04-04 PROBLEM — Z86.73 HX OF TRANSIENT ISCHEMIC ATTACK (TIA): Status: ACTIVE | Noted: 2017-10-30

## 2023-04-04 PROBLEM — M50.30 DDD (DEGENERATIVE DISC DISEASE), CERVICAL: Status: ACTIVE | Noted: 2018-09-26

## 2023-04-04 PROBLEM — I25.10 CORONARY ARTERY DISEASE INVOLVING NATIVE CORONARY ARTERY OF NATIVE HEART WITHOUT ANGINA PECTORIS: Status: ACTIVE | Noted: 2018-12-18

## 2023-04-04 PROBLEM — I48.0 PAROXYSMAL ATRIAL FIBRILLATION (HCC): Status: ACTIVE | Noted: 2017-12-13

## 2023-04-04 LAB
SL AMB  POCT GLUCOSE, UA: ABNORMAL
SL AMB LEUKOCYTE ESTERASE,UA: ABNORMAL
SL AMB POCT BILIRUBIN,UA: ABNORMAL
SL AMB POCT BLOOD,UA: ABNORMAL
SL AMB POCT CLARITY,UA: CLEAR
SL AMB POCT COLOR,UA: YELLOW
SL AMB POCT KETONES,UA: ABNORMAL
SL AMB POCT NITRITE,UA: ABNORMAL
SL AMB POCT PH,UA: 6
SL AMB POCT SPECIFIC GRAVITY,UA: 1025
SL AMB POCT URINE PROTEIN: ABNORMAL
SL AMB POCT UROBILINOGEN: 0.2

## 2023-04-04 RX ORDER — AMOXICILLIN AND CLAVULANATE POTASSIUM 875; 125 MG/1; MG/1
1 TABLET, FILM COATED ORAL EVERY 12 HOURS SCHEDULED
Qty: 14 TABLET | Refills: 0 | Status: SHIPPED | OUTPATIENT
Start: 2023-04-04 | End: 2023-04-11

## 2023-04-04 NOTE — PATIENT INSTRUCTIONS
Diverticulitis   AMBULATORY CARE:   Diverticulitis  is a condition that causes small pockets along your intestine called diverticula to become inflamed or infected  This is caused by hard bowel movement, food, or bacteria that get stuck in the pockets  Signs and symptoms include the following:   Pain in the lower left side of your abdomen    Fever and chills    Nausea or vomiting    Constipation or diarrhea    An urge to urinate or have a bowel movement more often than usual    Bloody bowel movements    Bloating and gas    Seek care immediately if:   You have bowel movement or foul-smelling discharge leaking from your vagina or in your urine  You have severe diarrhea  You urinate less than usual or not at all  You are not able to have a bowel movement  You cannot stop vomiting  You have cramps or severe abdominal pain and a fever  You have new or increased blood in your bowel movements  Call your doctor if:   You have pain when you urinate  Your symptoms get worse or do not go away  You have questions or concerns about your condition or care  Treatment:  Mild diverticulitis can be treated at home  You may need to rest and follow a clear liquid diet until your diverticulitis gets better  You will be admitted to the hospital if you have severe diverticulitis  You may need any of the following:  Antibiotics  help treat or prevent a bacterial infection  Prescription pain medicine  may be given  Ask your healthcare provider how to take this medicine safely  Some prescription pain medicines contain acetaminophen  Do not take other medicines that contain acetaminophen without talking to your healthcare provider  Too much acetaminophen may cause liver damage  Prescription pain medicine may cause constipation  Ask your healthcare provider how to prevent or treat constipation  An IV  may be used to give you liquids and nutrition   You may not be able to eat or drink anything until your healthcare provider says it is okay  Drainage  may be done to reduce inflammation or treat infection  Your healthcare provider may insert a small tube through an incision in your abdomen to drain pus from infected diverticula  Surgery  may be needed if there is a hole in your bowel or a large amount of swelling  A healthcare provider will remove the infected or inflamed areas of your colon  Clear liquid diet:  A clear liquid diet includes any liquids that you can see through  Examples include water, ginger-jovanni, cranberry or apple juice, frozen fruit ice, or broth  Stay on a clear liquid diet until your symptoms are gone, or as directed  Follow up with your doctor as directed: You may need to return for a colonoscopy  When your symptoms are gone, you may need a low-fat, high-fiber diet to prevent diverticulitis from developing again  Your healthcare provider or dietitian can help you create meal plans  Write down your questions so you remember to ask them during your visits  © Copyright [employer] 2022 Information is for End User's use only and may not be sold, redistributed or otherwise used for commercial purposes  The above information is an  only  It is not intended as medical advice for individual conditions or treatments  Talk to your doctor, nurse or pharmacist before following any medical regimen to see if it is safe and effective for you

## 2023-04-04 NOTE — PROGRESS NOTES
[facility]        NAME: Adenike Morales is a 81 y o  female  : 1940    MRN: [id number]  DATE: 2023  TIME: 5:32 PM    Assessment and Plan   Diverticulitis [K57 92]  1  Diverticulitis  amoxicillin-clavulanate (AUGMENTIN) 875-125 mg per tablet      2  Acute bilateral low back pain without sciatica  POCT urine dip    Urine culture            Patient Instructions   Patient Instructions     Diverticulitis   AMBULATORY CARE:   Diverticulitis  is a condition that causes small pockets along your intestine called diverticula to become inflamed or infected  This is caused by hard bowel movement, food, or bacteria that get stuck in the pockets  Signs and symptoms include the following:   • Pain in the lower left side of your abdomen    • Fever and chills    • Nausea or vomiting    • Constipation or diarrhea    • An urge to urinate or have a bowel movement more often than usual    • Bloody bowel movements    • Bloating and gas    Seek care immediately if:   • You have bowel movement or foul-smelling discharge leaking from your vagina or in your urine  • You have severe diarrhea  • You urinate less than usual or not at all  • You are not able to have a bowel movement  • You cannot stop vomiting  • You have cramps or severe abdominal pain and a fever  • You have new or increased blood in your bowel movements  Call your doctor if:   • You have pain when you urinate  • Your symptoms get worse or do not go away  • You have questions or concerns about your condition or care  Treatment:  Mild diverticulitis can be treated at home  You may need to rest and follow a clear liquid diet until your diverticulitis gets better  You will be admitted to the hospital if you have severe diverticulitis  You may need any of the following:  • Antibiotics  help treat or prevent a bacterial infection  • Prescription pain medicine  may be given   Ask your healthcare provider how to take this medicine safely  Some prescription pain medicines contain acetaminophen  Do not take other medicines that contain acetaminophen without talking to your healthcare provider  Too much acetaminophen may cause liver damage  Prescription pain medicine may cause constipation  Ask your healthcare provider how to prevent or treat constipation  • An IV  may be used to give you liquids and nutrition  You may not be able to eat or drink anything until your healthcare provider says it is okay  • Drainage  may be done to reduce inflammation or treat infection  Your healthcare provider may insert a small tube through an incision in your abdomen to drain pus from infected diverticula  • Surgery  may be needed if there is a hole in your bowel or a large amount of swelling  A healthcare provider will remove the infected or inflamed areas of your colon  Clear liquid diet:  A clear liquid diet includes any liquids that you can see through  Examples include water, ginger-jovanni, cranberry or apple juice, frozen fruit ice, or broth  Stay on a clear liquid diet until your symptoms are gone, or as directed  Follow up with your doctor as directed: You may need to return for a colonoscopy  When your symptoms are gone, you may need a low-fat, high-fiber diet to prevent diverticulitis from developing again  Your healthcare provider or dietitian can help you create meal plans  Write down your questions so you remember to ask them during your visits  © Copyright [employer] 2022 Information is for End User's use only and may not be sold, redistributed or otherwise used for commercial purposes  The above information is an  only  It is not intended as medical advice for individual conditions or treatments  Talk to your doctor, nurse or pharmacist before following any medical regimen to see if it is safe and effective for you  Follow up with PCP in 3-5 days  Proceed to  ER if symptoms worsen      Chief Complaint     Chief Complaint   Patient presents with   • Hip Pain     Left hip pain      • Nausea   • Back Pain         History of Present Illness       The patient is a 58-year-old female with past medical history significant for diverticulosis,  A-fib, hypothyroidism, hypertension, and degenerative disc disease who presents to the clinic complaining of lower back pain, upset stomach, and diarrhea for the past few days  The patient states that she did have a colonoscopy last year that did diverticulosis and some small hemorrhoids but no significant bleeding  She states that she does not take anything for her degenerative disc disease  She states that she noticed pain in her left lower back that radiates to her left hip as well as upset stomach over the last few days  She denies any specific fall or injury to her hip  She also denies swelling of her left hip  She states that the pain is not worse with ambulating  She describes the pain as an aching pain that seems to be worse with movement  She also has had some associated nausea and urinary frequency  She denies history of diverticulitis  Review of Systems   Review of Systems   Constitutional: Negative for chills  HENT: Positive for sore throat  Negative for congestion, facial swelling, mouth sores, nosebleeds, postnasal drip, sinus pressure, sinus pain and sneezing  Respiratory: Negative for apnea, cough, choking, shortness of breath and stridor  Gastrointestinal: Positive for abdominal pain, blood in stool and diarrhea  Negative for anal bleeding, constipation, nausea, rectal pain and vomiting  Genitourinary: Positive for frequency  Negative for difficulty urinating, dyspareunia, dysuria, enuresis, flank pain and hematuria  Musculoskeletal: Positive for arthralgias and gait problem  Negative for joint swelling           Current Medications       Current Outpatient Medications:   •  amoxicillin-clavulanate (AUGMENTIN) 875-125 mg per "tablet, Take 1 tablet by mouth every 12 (twelve) hours for 7 days, Disp: 14 tablet, Rfl: 0  •  levothyroxine 50 mcg tablet, Take 50 mcg by mouth daily, Disp: , Rfl:   •  metoprolol tartrate (LOPRESSOR) 25 mg tablet, Take 25 mg by mouth daily, Disp: , Rfl:   •  Warfarin Sodium (COUMADIN PO), Take by mouth, Disp: , Rfl:   •  al mag oxide-diphenhydramine-lidocaine viscous (MAGIC MOUTHWASH) 1:1:1 suspension, Swish and spit 10 mL every 4 (four) hours as needed for mouth pain or discomfort, Disp: 150 mL, Rfl: 0  •  meclizine (ANTIVERT) 25 mg tablet, Take 1 tablet (25 mg total) by mouth 3 (three) times a day as needed for dizziness (Patient not taking: Reported on 12/30/2021 ), Disp: 30 tablet, Rfl: 0  •  triamcinolone (KENALOG) 0 1 % cream, Apply topically 2 (two) times a day until healed  , Disp: 30 g, Rfl: 0    Current Allergies     Allergies as of 04/04/2023 - Reviewed 04/04/2023   Allergen Reaction Noted   • Erythromycin Hives 08/06/2022            The following portions of the patient's history were reviewed and updated as appropriate: allergies, current medications, past family history, past medical history, past social history, past surgical history and problem list      Past Medical History:   Diagnosis Date   • Anxiety    • Fibromyalgia    • Hypercholesteremia    • Melanoma ([facility] )    • MI (myocardial infarction) ([facility] )    • Stroke [facility])        Past Surgical History:   Procedure Laterality Date   • APPENDECTOMY     • CHOLECYSTECTOMY     • THYROIDECTOMY         History reviewed  No pertinent family history  Medications have been verified  Objective   /72   Pulse 68   Temp 98 2 °F (36 8 °C)   Resp 18   Ht 5' 4\" (1 626 m)   Wt 83 kg (183 lb)   SpO2 95%   BMI 31 41 kg/m²        Physical Exam     Physical Exam  Constitutional:       Appearance: She is well-developed  She is not diaphoretic  HENT:      Head: Normocephalic        Right Ear: Tympanic membrane normal       Nose: No congestion or " rhinorrhea  Eyes:      General:         Right eye: No discharge  Left eye: No discharge  Pupils: Pupils are equal, round, and reactive to light  Neck:      Thyroid: No thyromegaly  Cardiovascular:      Rate and Rhythm: Rhythm irregular  Heart sounds: No murmur heard  Pulmonary:      Effort: Pulmonary effort is normal  No respiratory distress  Breath sounds: No wheezing or rales  Chest:      Chest wall: No tenderness  Abdominal:      General: Bowel sounds are decreased  There is no distension  Palpations: Abdomen is soft  Tenderness: There is abdominal tenderness in the left lower quadrant  There is no right CVA tenderness, left CVA tenderness, guarding or rebound  Comments: There is tenderness to deep palpation left lower quadrant  There is no guarding or rebound  There are no signs of acute abdomen  Musculoskeletal:      Cervical back: Normal range of motion  Thoracic back: Decreased range of motion  Lumbar back: No deformity, spasms, tenderness or bony tenderness  Decreased range of motion  Negative right straight leg raise test and negative left straight leg raise test    Lymphadenopathy:      Cervical: No cervical adenopathy  Skin:     General: Skin is warm  Neurological:      Mental Status: She is alert and oriented to person, place, and time            -Patient's urine dip does show small leukocytes as well as small amount of blood  Her signs and symptoms are most consistent with infection such as UTI or diverticulitis  I will treat her with Augmentin  I suggest close follow-up with her primary care doctor in the next 48 to 72 hours  The patient will go to the ER if symptoms worsen

## 2023-04-05 LAB — BACTERIA UR CULT: NORMAL

## 2023-05-14 ENCOUNTER — APPOINTMENT (EMERGENCY)
Dept: RADIOLOGY | Facility: HOSPITAL | Age: 83
End: 2023-05-14

## 2023-05-14 ENCOUNTER — HOSPITAL ENCOUNTER (EMERGENCY)
Facility: HOSPITAL | Age: 83
Discharge: HOME | End: 2023-05-14
Attending: EMERGENCY MEDICINE | Admitting: EMERGENCY MEDICINE

## 2023-05-14 VITALS
RESPIRATION RATE: 16 BRPM | HEART RATE: 55 BPM | SYSTOLIC BLOOD PRESSURE: 139 MMHG | OXYGEN SATURATION: 95 % | TEMPERATURE: 98 F | DIASTOLIC BLOOD PRESSURE: 69 MMHG

## 2023-05-14 DIAGNOSIS — J03.90 TONSILLITIS: ICD-10-CM

## 2023-05-14 DIAGNOSIS — J02.9 PHARYNGITIS: Primary | ICD-10-CM

## 2023-05-14 DIAGNOSIS — J06.9 URI (UPPER RESPIRATORY INFECTION): ICD-10-CM

## 2023-05-14 LAB
ALBUMIN SERPL BCP-MCNC: 3.9 G/DL (ref 3.5–5)
ALP SERPL-CCNC: 70 U/L (ref 34–104)
ALT SERPL W P-5'-P-CCNC: 17 U/L (ref 7–52)
ANION GAP SERPL CALCULATED.3IONS-SCNC: 5 MMOL/L (ref 4–13)
APTT PPP: 34 SECONDS (ref 23–37)
AST SERPL W P-5'-P-CCNC: 17 U/L (ref 13–39)
BASOPHILS # BLD AUTO: 0.04 THOUSANDS/ÂΜL (ref 0–0.1)
BASOPHILS NFR BLD AUTO: 1 % (ref 0–1)
BILIRUB SERPL-MCNC: 0.42 MG/DL (ref 0.2–1)
BUN SERPL-MCNC: 18 MG/DL (ref 5–25)
CALCIUM SERPL-MCNC: 9 MG/DL (ref 8.4–10.2)
CHLORIDE SERPL-SCNC: 107 MMOL/L (ref 96–108)
CK SERPL-CCNC: 50 U/L (ref 26–192)
CO2 SERPL-SCNC: 25 MMOL/L (ref 21–32)
CREAT SERPL-MCNC: 0.73 MG/DL (ref 0.6–1.3)
CRP SERPL QL: 2.6 MG/L
EOSINOPHIL # BLD AUTO: 0.37 THOUSAND/ÂΜL (ref 0–0.61)
EOSINOPHIL NFR BLD AUTO: 6 % (ref 0–6)
ERYTHROCYTE [DISTWIDTH] IN BLOOD BY AUTOMATED COUNT: 13.2 % (ref 11.6–15.1)
FLUAV RNA RESP QL NAA+PROBE: NEGATIVE
FLUBV RNA RESP QL NAA+PROBE: NEGATIVE
GFR SERPL CREATININE-BSD FRML MDRD: 77 ML/MIN/1.73SQ M
GLUCOSE SERPL-MCNC: 79 MG/DL (ref 65–140)
HCT VFR BLD AUTO: 43.1 % (ref 34.8–46.1)
HGB BLD-MCNC: 13.9 G/DL (ref 11.5–15.4)
IMM GRANULOCYTES # BLD AUTO: 0.02 THOUSAND/UL (ref 0–0.2)
IMM GRANULOCYTES NFR BLD AUTO: 0 % (ref 0–2)
INR PPP: 1.94 (ref 0.84–1.19)
LYMPHOCYTES # BLD AUTO: 1.72 THOUSANDS/ÂΜL (ref 0.6–4.47)
LYMPHOCYTES NFR BLD AUTO: 28 % (ref 14–44)
MCH RBC QN AUTO: 30.7 PG (ref 26.8–34.3)
MCHC RBC AUTO-ENTMCNC: 32.3 G/DL (ref 31.4–37.4)
MCV RBC AUTO: 95 FL (ref 82–98)
MONOCYTES # BLD AUTO: 0.84 THOUSAND/ÂΜL (ref 0.17–1.22)
MONOCYTES NFR BLD AUTO: 14 % (ref 4–12)
NEUTROPHILS # BLD AUTO: 3.14 THOUSANDS/ÂΜL (ref 1.85–7.62)
NEUTS SEG NFR BLD AUTO: 51 % (ref 43–75)
NRBC BLD AUTO-RTO: 0 /100 WBCS
PLATELET # BLD AUTO: 320 THOUSANDS/UL (ref 149–390)
PMV BLD AUTO: 8.8 FL (ref 8.9–12.7)
POTASSIUM SERPL-SCNC: 4.4 MMOL/L (ref 3.5–5.3)
PROT SERPL-MCNC: 6.4 G/DL (ref 6.4–8.4)
PROTHROMBIN TIME: 22.2 SECONDS (ref 11.6–14.5)
RBC # BLD AUTO: 4.53 MILLION/UL (ref 3.81–5.12)
RSV RNA RESP QL NAA+PROBE: NEGATIVE
S PYO DNA THROAT QL NAA+PROBE: NOT DETECTED
SARS-COV-2 RNA RESP QL NAA+PROBE: NEGATIVE
SODIUM SERPL-SCNC: 137 MMOL/L (ref 135–147)
WBC # BLD AUTO: 6.13 THOUSAND/UL (ref 4.31–10.16)

## 2023-05-14 RX ORDER — AMOXICILLIN AND CLAVULANATE POTASSIUM 875; 125 MG/1; MG/1
1 TABLET, FILM COATED ORAL EVERY 12 HOURS
Qty: 20 TABLET | Refills: 0 | Status: SHIPPED | OUTPATIENT
Start: 2023-05-14 | End: 2023-05-24

## 2023-05-14 RX ORDER — AMOXICILLIN AND CLAVULANATE POTASSIUM 875; 125 MG/1; MG/1
1 TABLET, FILM COATED ORAL ONCE
Status: COMPLETED | OUTPATIENT
Start: 2023-05-14 | End: 2023-05-14

## 2023-05-14 RX ORDER — DEXAMETHASONE SODIUM PHOSPHATE 10 MG/ML
10 INJECTION, SOLUTION INTRAMUSCULAR; INTRAVENOUS ONCE
Status: COMPLETED | OUTPATIENT
Start: 2023-05-14 | End: 2023-05-14

## 2023-05-14 RX ORDER — KETOROLAC TROMETHAMINE 30 MG/ML
15 INJECTION, SOLUTION INTRAMUSCULAR; INTRAVENOUS ONCE
Status: COMPLETED | OUTPATIENT
Start: 2023-05-14 | End: 2023-05-14

## 2023-05-14 RX ADMIN — SODIUM CHLORIDE 1000 ML: 0.9 INJECTION, SOLUTION INTRAVENOUS at 19:33

## 2023-05-14 RX ADMIN — AMOXICILLIN AND CLAVULANATE POTASSIUM 1 TABLET: 875; 125 TABLET, FILM COATED ORAL at 20:30

## 2023-05-14 RX ADMIN — DEXAMETHASONE SODIUM PHOSPHATE 10 MG: 10 INJECTION, SOLUTION INTRAMUSCULAR; INTRAVENOUS at 19:36

## 2023-05-14 RX ADMIN — KETOROLAC TROMETHAMINE 15 MG: 30 INJECTION, SOLUTION INTRAMUSCULAR; INTRAVENOUS at 19:37

## 2023-05-14 NOTE — ED PROVIDER NOTES
Case Management Discharge Planning    Admission Date: 7/4/2022  GMLOS: 8  ALOS: 17    6-Clicks ADL Score: 17  6-Clicks Mobility Score: 7  PT and/or OT Eval ordered: Yes  Post-acute Referrals Ordered: Yes  Post-acute Choice Obtained: Yes  Has referral(s) been sent to post-acute provider:  Yes      Anticipated Discharge Dispo: Discharge Disposition: D/T to SNF with Medicare cert in anticipation of skilled care (03)    DME Needed: No    Action(s) Taken: Updated Provider/Nurse on Discharge Plan    Escalations Completed: None    Medically Clear: Yes    Next Steps: Patient discussed in IDT rounds, is medically ready for DC. CM left voicemail for [facility] asking if insurance auth has been received and if bed is available.     Barriers to Discharge: Pending insurance auth and placement    Is the patient up for discharge tomorrow: No         History  Chief Complaint   Patient presents with   • Weakness - Generalized     Pt reports chills, sore throat beginning one week ago  Reports generalized muscle pain, nausea, and weakness increasing throughout the week     Is a an 50-year-old female with a history of anxiety fibromyalgia hyperlipidemia MI and stroke presents to the emergency department complaining of sore throat and aches in her muscles and joints and subjective fevers and chills symptoms started about 2 weeks ago still present worsening also having some generalized fatigue patient admits to mild postnasal drip and cough no shortness of breath no chest pain  History provided by:  Patient  SHANNONI  Presenting symptoms: congestion, cough, fatigue, fever and sore throat    Presenting symptoms: no ear pain and no rhinorrhea    Severity:  Mild  Onset quality:  Gradual  Duration:  2 weeks  Timing:  Constant  Progression:  Worsening  Chronicity:  New  Associated symptoms: no arthralgias, no headaches, no myalgias and no wheezing        Prior to Admission Medications   Prescriptions Last Dose Informant Patient Reported? Taking? Warfarin Sodium (COUMADIN PO)   Yes No   Sig: Take by mouth   al mag oxide-diphenhydramine-lidocaine viscous (MAGIC MOUTHWASH) 1:1:1 suspension   No No   Sig: Swish and spit 10 mL every 4 (four) hours as needed for mouth pain or discomfort   levothyroxine 50 mcg tablet   Yes No   Sig: Take 50 mcg by mouth daily   meclizine (ANTIVERT) 25 mg tablet   No No   Sig: Take 1 tablet (25 mg total) by mouth 3 (three) times a day as needed for dizziness   Patient not taking: Reported on 12/30/2021    metoprolol tartrate (LOPRESSOR) 25 mg tablet   Yes No   Sig: Take 25 mg by mouth daily   triamcinolone (KENALOG) 0 1 % cream   No No   Sig: Apply topically 2 (two) times a day until healed        Facility-Administered Medications: None       Past Medical History:   Diagnosis Date   • Anxiety    • Fibromyalgia    • Hypercholesteremia    • [facility]anoma ([facility] )    • MI (myocardial infarction) ([facility] )    • Stroke [facility])        Past Surgical History:   Procedure Laterality Date   • APPENDECTOMY     • CHOLECYSTECTOMY     • THYROIDECTOMY         History reviewed  No pertinent family history  I have reviewed and agree with the history as documented  E-Cigarette/Vaping   • E-Cigarette Use Never User      E-Cigarette/Vaping Substances     Social History     Tobacco Use   • Smoking status: Never   • Smokeless tobacco: Never   Vaping Use   • Vaping Use: Never used   Substance Use Topics   • Alcohol use: Not Currently   • Drug use: Never       Review of Systems   Constitutional: Positive for chills, fatigue and fever  Negative for activity change and appetite change  HENT: Positive for congestion and sore throat  Negative for ear pain and rhinorrhea  Eyes: Negative for discharge, redness and visual disturbance  Respiratory: Positive for cough  Negative for chest tightness, shortness of breath and wheezing  Cardiovascular: Negative for chest pain and palpitations  Gastrointestinal: Negative for abdominal pain, constipation, diarrhea, nausea and vomiting  Endocrine: Negative for polydipsia and polyuria  Genitourinary: Negative for difficulty urinating, dysuria, frequency, hematuria and urgency  Musculoskeletal: Negative for arthralgias and myalgias  Skin: Negative for color change, pallor and rash  Neurological: Negative for dizziness, weakness, light-headedness, numbness and headaches  Hematological: Negative for adenopathy  Does not bruise/bleed easily  All other systems reviewed and are negative  Physical Exam  Physical Exam  Vitals and nursing note reviewed  Constitutional:       Appearance: She is well-developed  HENT:      Head: Normocephalic and atraumatic  Right Ear: External ear normal       Left Ear: External ear normal       Nose: Congestion present        Mouth/Throat:      Pharynx: Posterior oropharyngeal erythema present  No uvula swelling  Tonsils: No tonsillar abscesses  2+ on the right  2+ on the left  Eyes:      Conjunctiva/sclera: Conjunctivae normal       Pupils: Pupils are equal, round, and reactive to light  Cardiovascular:      Rate and Rhythm: Normal rate and regular rhythm  Heart sounds: Normal heart sounds  Pulmonary:      Effort: Pulmonary effort is normal  No respiratory distress  Breath sounds: Normal breath sounds  No wheezing or rales  Chest:      Chest wall: No tenderness  Abdominal:      General: Bowel sounds are normal  There is no distension  Palpations: Abdomen is soft  Tenderness: There is no abdominal tenderness  There is no guarding  Musculoskeletal:         General: Normal range of motion  Cervical back: Normal range of motion and neck supple  Skin:     General: Skin is warm and dry  Neurological:      Mental Status: She is alert and oriented to person, place, and time  Cranial Nerves: No cranial nerve deficit  Sensory: No sensory deficit           Vital Signs  ED Triage Vitals [05/14/23 1912]   Temperature Pulse Respirations Blood Pressure SpO2   98 °F (36 7 °C) 62 16 125/76 96 %      Temp Source Heart Rate Source Patient Position - Orthostatic VS BP Location FiO2 (%)   Oral -- Lying Right arm --      Pain Score       6           Vitals:    05/14/23 1912 05/14/23 1945   BP: 125/76 139/69   Pulse: 62 55   Patient Position - Orthostatic VS: Lying          Visual Acuity      ED Medications  Medications   sodium chloride 0 9 % bolus 1,000 mL (1,000 mL Intravenous New Bag 5/14/23 1933)   amoxicillin-clavulanate (AUGMENTIN) 875-125 mg per tablet 1 tablet (has no administration in time range)   dexamethasone (PF) (DECADRON) injection 10 mg (10 mg Intravenous Given 5/14/23 1936)   ketorolac (TORADOL) injection 15 mg (15 mg Intravenous Given 5/14/23 1937)       Diagnostic Studies  Results Reviewed     Procedure Component Value Units Date/Time    Strep A PCR [[id number]]  (Normal) Collected: 05/14/23 1932    Lab Status: Final result Specimen: Throat Updated: 05/14/23 2012     STREP A PCR Not Detected    CK [[id number]]  (Normal) Collected: 05/14/23 1932    Lab Status: Final result Specimen: Blood from Arm, Left Updated: 05/14/23 2006     Total CK 50 U/L     Comprehensive metabolic panel [[id number]] Collected: 05/14/23 1932    Lab Status: Final result Specimen: Blood from Arm, Left Updated: 05/14/23 2006     Sodium 137 mmol/L      Potassium 4 4 mmol/L      Chloride 107 mmol/L      CO2 25 mmol/L      ANION GAP 5 mmol/L      BUN 18 mg/dL      Creatinine 0 73 mg/dL      Glucose 79 mg/dL      Calcium 9 0 mg/dL      AST 17 U/L      ALT 17 U/L      Alkaline Phosphatase 70 U/L      Total Protein 6 4 g/dL      Albumin 3 9 g/dL      Total Bilirubin 0 42 mg/dL      eGFR 77 ml/min/1 73sq m     Narrative:      [place] guidelines for Chronic Kidney Disease (CKD):   •  Stage 1 with normal or high GFR (GFR > 90 mL/min/1 73 square meters)  •  Stage 2 Mild CKD (GFR = 60-89 mL/min/1 73 square meters)  •  Stage 3A Moderate CKD (GFR = 45-59 mL/min/1 73 square meters)  •  Stage 3B Moderate CKD (GFR = 30-44 mL/min/1 73 square meters)  •  Stage 4 Severe CKD (GFR = 15-29 mL/min/1 73 square meters)  •  Stage 5 End Stage CKD (GFR <15 mL/min/1 73 square meters)  Note: GFR calculation is accurate only with a steady state creatinine    C-reactive protein [[id number]]  (Normal) Collected: 05/14/23 1932    Lab Status: Final result Specimen: Blood from Arm, Left Updated: 05/14/23 2006     CRP 2 6 mg/L     Protime-INR [[id number]]  (Abnormal) Collected: 05/14/23 1932    Lab Status: Final result Specimen: Blood from Arm, Left Updated: 05/14/23 2000     Protime 22 2 seconds      INR 1 94    APTT [[id number]]  (Normal) Collected: 05/14/23 1932    Lab Status: Final result Specimen: Blood from Arm, Left Updated: 05/14/23 2000     PTT 34 seconds     CBC and differential [[id number]] (Abnormal) Collected: 05/14/23 1932    Lab Status: Final result Specimen: Blood from Arm, Left Updated: 05/14/23 1948     WBC 6 13 Thousand/uL      RBC 4 53 Million/uL      Hemoglobin 13 9 g/dL      Hematocrit 43 1 %      MCV 95 fL      MCH 30 7 pg      MCHC 32 3 g/dL      RDW 13 2 %      MPV 8 8 fL      Platelets 366 Thousands/uL      nRBC 0 /100 WBCs      Neutrophils Relative 51 %      Immat GRANS % 0 %      Lymphocytes Relative 28 %      Monocytes Relative 14 %      Eosinophils Relative 6 %      Basophils Relative 1 %      Neutrophils Absolute 3 14 Thousands/µL      Immature Grans Absolute 0 02 Thousand/uL      Lymphocytes Absolute 1 72 Thousands/µL      Monocytes Absolute 0 84 Thousand/µL      Eosinophils Absolute 0 37 Thousand/µL      Basophils Absolute 0 04 Thousands/µL     FLU/RSV/COVID - if FLU/RSV clinically relevant [[id number]] Collected: 05/14/23 1932    Lab Status: In process Specimen: Nares from Nose Updated: 05/14/23 1946                 XR chest 1 view portable   ED Interpretation by Bhavana Levine DO (05/14 1934)   No acute cardiopulmonary disease                 Procedures  Procedures         ED Course                                             Medical Decision Making  Patient remained clinically and hemodynamically stable in the emergency department she is afebrile nontoxic well-appearing work-up in the ED reveals no evidence of acute significant pathology history and examination consistent with a tonsillitis and pharyngitis we will treat with Augmentin for now advised Tylenol as needed plenty fluids and supportive care and prompt follow-up with primary physician for further evaluation and treatment and obtain test results return precautions and anticipatory guidance discussed    Pharyngitis: acute illness or injury  Tonsillitis: acute illness or injury  URI (upper respiratory infection): acute illness or injury  Amount and/or Complexity of Data Reviewed  Labs: ordered   Decision-making details documented in ED Course  Radiology: ordered and independent interpretation performed  Decision-making details documented in ED Course  Risk  Prescription drug management  Disposition  Final diagnoses:   Pharyngitis   URI (upper respiratory infection)   Tonsillitis     Time reflects when diagnosis was documented in both MDM as applicable and the Disposition within this note     Time User Action Codes Description Comment    5/14/2023  8:15 PM Ever Abt Add [J02 9] Pharyngitis     5/14/2023  8:15 PM Johnny Bustamantea Add [J06 9] URI (upper respiratory infection)     5/14/2023  8:15 PM Ever Abt Add [J03 90] Tonsillitis       ED Disposition     ED Disposition   Discharge    Condition   Stable    Date/Time   Sun May 14, 2023  8:15 PM    Comment   Carmel Moreau discharge to home/self care  Follow-up Information     Follow up With Specialties Details Why [address], [place] [facility] Medicine Schedule an appointment as soon as possible for a visit in 3 days  [address] [facility] [id number] [phone]            Patient's Medications   Discharge Prescriptions    AMOXICILLIN-CLAVULANATE (AUGMENTIN) 875-125 MG PER TABLET    Take 1 tablet by mouth every 12 (twelve) hours for 10 days       Start Date: 5/14/2023 End Date: 5/24/2023       Order Dose: 1 tablet       Quantity: 20 tablet    Refills: 0       No discharge procedures on file      PDMP Review     None          ED Provider  Electronically Signed by           Mario Majano DO  05/14/23 2018

## 2023-05-30 ENCOUNTER — OFFICE VISIT (OUTPATIENT)
Dept: URGENT CARE | Facility: CLINIC | Age: 83
End: 2023-05-30

## 2023-05-30 ENCOUNTER — APPOINTMENT (OUTPATIENT)
Dept: RADIOLOGY | Facility: CLINIC | Age: 83
End: 2023-05-30

## 2023-05-30 VITALS
HEIGHT: 64 IN | TEMPERATURE: 98.2 F | OXYGEN SATURATION: 95 % | BODY MASS INDEX: 31.07 KG/M2 | WEIGHT: 182 LBS | DIASTOLIC BLOOD PRESSURE: 80 MMHG | SYSTOLIC BLOOD PRESSURE: 130 MMHG | HEART RATE: 64 BPM | RESPIRATION RATE: 18 BRPM

## 2023-05-30 DIAGNOSIS — M79.672 PAIN OF LEFT HEEL: ICD-10-CM

## 2023-05-30 DIAGNOSIS — M77.32 HEEL SPUR, LEFT: Primary | ICD-10-CM

## 2023-05-30 RX ORDER — WARFARIN SODIUM 5 MG/1
TABLET ORAL
COMMUNITY
Start: 2023-03-29

## 2023-05-30 NOTE — PATIENT INSTRUCTIONS
There is a heel spur noted on your x-ray  Take a frozen water bottle and roll this with your foot to help stretch that out  You may take anti-inflammatories to help  Please follow-up with podiatry for further treatment  Wear good fitted shoes and never walk barefoot

## 2023-05-30 NOTE — PROGRESS NOTES
[facility]        NAME: Kalyn Mckeon is a 81 y o  female  : 1940    MRN: [id number]  DATE: May 30, 2023  TIME: 6:23 PM    Assessment and Plan   Heel spur, left [M77 32]  1  Heel spur, left  XR foot 3+ vw left    Ambulatory Referral to Podiatry        X-ray-spur noted we will place referral to podiatry  Supportive measures discussed    Patient Instructions   There is a heel spur noted on your x-ray  Take a frozen water bottle and roll this with your foot to help stretch that out  You may take anti-inflammatories to help  Please follow-up with podiatry for further treatment  Wear good fitted shoes and never walk barefoot  Follow up with PCP in 3-5 days  Proceed to  ER if symptoms worsen  Chief Complaint     Chief Complaint   Patient presents with   • Foot Pain     No injury, left heel pain         History of Present Illness       Patient is an 81year old female who presents to the office today for left foot pain  States it feels like she is standing on a stone  Helped with tylenol  Worse with pressure  Denies injury  Symptoms have been ongoing since the week before Mother's Day  Review of Systems   Review of Systems   Musculoskeletal: Positive for gait problem  All other systems reviewed and are negative          Current Medications       Current Outpatient Medications:   •  levothyroxine 50 mcg tablet, Take 50 mcg by mouth daily, Disp: , Rfl:   •  metoprolol tartrate (LOPRESSOR) 25 mg tablet, Take 25 mg by mouth daily, Disp: , Rfl:   •  warfarin (COUMADIN) 5 mg tablet, TAKE 1 & 1/2 TABLETS BY MOUTH EVERY MONDAY AND FRIDAY,THEN TAKE 1 TABLET BY MOUTH ALL OTHER DAYS , Disp: , Rfl:   •  al mag oxide-diphenhydramine-lidocaine viscous (MAGIC MOUTHWASH) 1:1:1 suspension, Swish and spit 10 mL every 4 (four) hours as needed for mouth pain or discomfort, Disp: 150 mL, Rfl: 0  •  meclizine (ANTIVERT) 25 mg tablet, Take 1 tablet (25 mg total) by mouth 3 (three) times a day as needed "for dizziness (Patient not taking: Reported on 12/30/2021 ), Disp: 30 tablet, Rfl: 0  •  triamcinolone (KENALOG) 0 1 % cream, Apply topically 2 (two) times a day until healed  , Disp: 30 g, Rfl: 0  •  Warfarin Sodium (COUMADIN PO), Take by mouth, Disp: , Rfl:     Current Allergies     Allergies as of 05/30/2023 - Reviewed 05/30/2023   Allergen Reaction Noted   • Erythromycin Hives 08/06/2022            The following portions of the patient's history were reviewed and updated as appropriate: allergies, current medications, past family history, past medical history, past social history, past surgical history and problem list      Past Medical History:   Diagnosis Date   • Anxiety    • Fibromyalgia    • Hypercholesteremia    • Melanoma ([facility] )    • MI (myocardial infarction) ([place] [address] )    • Stroke [facility])        Past Surgical History:   Procedure Laterality Date   • APPENDECTOMY     • CHOLECYSTECTOMY     • THYROIDECTOMY         History reviewed  No pertinent family history  Medications have been verified  Objective   /80   Pulse 64   Temp 98 2 °F (36 8 °C)   Resp 18   Ht 5' 4\" (1 626 m)   Wt 82 6 kg (182 lb)   SpO2 95%   BMI 31 24 kg/m²        Physical Exam     Physical Exam  Vitals and nursing note reviewed  Constitutional:       Appearance: Normal appearance  She is normal weight  Cardiovascular:      Rate and Rhythm: Normal rate and regular rhythm  Pulses: Normal pulses  Heart sounds: Normal heart sounds  Pulmonary:      Effort: Pulmonary effort is normal       Breath sounds: Normal breath sounds  Musculoskeletal:         General: Swelling and tenderness present  Right foot: Normal range of motion and normal capillary refill  Tenderness present  No swelling, deformity, bunion, bony tenderness or crepitus  Feet:       Comments: Cap refill less than 2 seconds  Patient has full range of motion of the digits and foot  Sensation is intact  Skin:     General: Skin is warm   " Capillary Refill: Capillary refill takes less than 2 seconds  Neurological:      Mental Status: She is alert

## 2023-06-26 ENCOUNTER — HOSPITAL ENCOUNTER (OUTPATIENT)
Dept: RADIOLOGY | Facility: CLINIC | Age: 83
Discharge: HOME | End: 2023-06-26
Payer: COMMERCIAL

## 2023-06-26 ENCOUNTER — OFFICE VISIT (OUTPATIENT)
Dept: PODIATRY | Age: 83
End: 2023-06-26
Payer: COMMERCIAL

## 2023-06-26 VITALS — BODY MASS INDEX: 31.07 KG/M2 | HEIGHT: 64 IN | WEIGHT: 182 LBS

## 2023-06-26 DIAGNOSIS — M72.2 PLANTAR FASCIITIS OF LEFT FOOT: ICD-10-CM

## 2023-06-26 DIAGNOSIS — I73.9 PVD (PERIPHERAL VASCULAR DISEASE) (HCC): ICD-10-CM

## 2023-06-26 DIAGNOSIS — M79.672 LEFT FOOT PAIN: ICD-10-CM

## 2023-06-26 DIAGNOSIS — L84 CALLUS OF FOOT: ICD-10-CM

## 2023-06-26 DIAGNOSIS — M79.672 LEFT FOOT PAIN: Primary | ICD-10-CM

## 2023-06-26 PROCEDURE — 99203 OFFICE O/P NEW LOW 30 MIN: CPT | Performed by: STUDENT IN AN ORGANIZED HEALTH CARE EDUCATION/TRAINING PROGRAM

## 2023-06-26 PROCEDURE — 73630 X-RAY EXAM OF FOOT: CPT

## 2023-06-26 PROCEDURE — 11055 PARING/CUTG B9 HYPRKER LES 1: CPT | Performed by: STUDENT IN AN ORGANIZED HEALTH CARE EDUCATION/TRAINING PROGRAM

## 2023-06-26 RX ORDER — BUSPIRONE HYDROCHLORIDE 5 MG/1
5 TABLET ORAL 3 TIMES DAILY PRN
COMMUNITY
Start: 2023-06-06

## 2023-06-26 RX ORDER — AMMONIUM LACTATE 12 G/100G
CREAM TOPICAL AS NEEDED
Qty: 385 G | Refills: 0 | Status: SHIPPED | OUTPATIENT
Start: 2023-06-26

## 2023-06-26 NOTE — PATIENT INSTRUCTIONS
Foot Pain Home Therapy    Stretch  Place painful foot in back with heel on ground and lean against wall  Do not lift heel off of ground  You will feel the stretch in the calf muscles and possibly the heel  Hold the stretch for 20-30 seconds  Do this at least 5-6 times per day  It is best done after exercise when your muscles are warm  Local ice massage  Freeze a 20oz bottle of water  Roll your foot over the ice bottle along the arch  Try this for 20 minutes, 2-3 times per day  Wear supportive shoes at all times  Avoid flip-flops, flat sandals, barefoot walking (never walk barefoot, even at home)  Generally avoid shoes that are too flexible and bend in the arch  Your shoes should only slightly bend in the toe area, not the middle  Running sneakers are often the best choice  Supportive sneaker brands: Jesse Bnun, [facility], [facility], [address] [place] [address] [place], [address], [place] [employer] (discount if mention Dr referred) or [employer] [place])  Supportive daily shoe brands: Vionic, [employer] [place], [place], Dansko, [place] branch, [place], [place], [employer]hitu  Supportive home shoes: William Johnson (recovery slides)  Purchase over the counter topical pain creams such as Voltarin gel, biofreeze, or CBD cream - will need to apply 2-3 times per day for benefit  + deep tissue massage  Achilles Tendon Stretching Exercises    A) Standing Gastrocnemius stretch  Place hands on wall or chair  If using wall, put your hands at eye level  Step the leg you want to stretch behind you  Keep your back heel on the floor and point your toes straight ahead or slightly inward towards the heel of the opposite foot  Bend your knee toward the wall while keeping your back leg straight  Lean toward the wall until you feel a gentle stretch in you calf of the straight leg  Don't lean so far that you feel pain  Hold for 15 seconds  Complete 3 reps  B) Standing soleus stretch  Place your hands on the wall or chair   If using wall, put your hands at eye level  Step the leg you want to stretch behind you (your back foot will need to be closer to the front foot than the above stretch)  Keep your back heel on the floor and point your toes straight ahead or slightly inward towards the heel of the opposite foot  Bend both your front and back knee at the same time (may help to stick your butt out)  You do not need to lean towards the wall, just bend the knees  Lean toward the wall until you feel a gentle stretch in you calf of the straight leg  Don't lean so far that you feel pain  Hold for 15 seconds  Complete 3 reps  Keep these tips and tricks in mind to get the most out of your stretching; Take your time - move slowly, whether you are deepening into a stretch or changing positions  This will limit the risk of injury & discomfort  Avoid bouncing - quick sudden movements will only worsen achilles tendon issues  Stay relaxed during stretch  Keep your heel down and toes straight ahead or slightly inward - this will allow the achilles tendon to stretch properly  Stop if you feel pain - Don't strain or force your muscle  If you feel sharp pain, stop immediately

## 2023-06-26 NOTE — PROGRESS NOTES
Assessment/Plan:     Diagnoses and all orders for this visit:    Left foot pain  -     X-ray foot left 3+ views; Future    Plantar fasciitis of left foot  -     Ambulatory Referral to Podiatry  -     Ambulatory referral to Physical Therapy; Future    Callus of foot  -     ammonium lactate (LAC-HYDRIN) 12 % cream; Apply topically as needed for dry skin Apply daily to right foot callus    PVD (peripheral vascular disease) ([address] )    Other orders  -     busPIRone (BUSPAR) 5 mg tablet; Take 5 mg by mouth 3 (three) times a day as needed  -     esomeprazole (NexIUM) 20 mg capsule; TAKE 1 CAPSULE BY MOUTH ONCE DAILY IN THE MORNING 1 HOUR PRIOR TO FIRST MEAL OF THE DAY          Imaging Reviewed at this visit (I personally reviewed/independently interpreted images and reports in PACS)  · XR left foot WB 3v 6/26/23: HAV with TALIA 16  Lateral deviation of toes 1-5  Midfoot arthritis and midfoot plantar sag  · XR left foot NWB 3v 5/30/23: no acute osseous abnormality noted  Lateral deviation of toes  2 screws to 1st metatarsal  Small plantar heel spur  IMPRESSION:  · Left heel pain  The differential diagnosis includes plantar fasciitis, Fernandez's nerve entrapment, venous insufficiency, degenerative changes (calcaneal spurs, arthrosis of the joints of the foot), and inflammatory conditions of the ligaments and fascia of the foot and ankle  · Right submet 2 HPK w/ PVD     PLAN:  · I reviewed clinical exam and radiographic imaging (XR's) with patient in detail today  I have discussed with the patient the pathophysiology of this diagnosis and reviewed how the examination correlates with this diagnosis  I explained at length the biomechanical abnormalities leading to the significant overload of the plantar fascia and medial ankle  I stressed the importance of proper shoe gear and OTC arch supports to reposition foot to reduce tension on soft tissue structures and give cushion    Instructions were given for conservative care which "was also demonstrated during the clinical visit  I stressed the importance of achilles tendon stretching, icing and applying voltarin gel or biofreeze (OTC) at least 3x/day  I recommend stiff bottomed sneakers/shoes (ex Asics, Vionic, New balance, [place], etc) for daily use and Dorothy Sigala (recovery slides) for in home use  I ordered the addition of PT at this visit to aide in reduction of equinus and also address the plantar fascia and gastroc aponeurosis with graston technique  · I pared right foot callus in thickness with a #10 blade to more normal epithelium  Pain improved  I recommended donut pads with amlacitn (I rx'd today) plus supportive/cushioned shoes  · I recommended compression stockings, LE elevation and low sodium diet  · F/u 6 weeks for left heel pain recheck      Subjective:      Patient ID: Tano Diop is a 81 y o  female  Lilliam Lipa presents to clinic today concerning left foot heel pain and right foot callus which causes pain as well  Notes left ankle swelling additionally  No recent trauma  The following portions of the patient's history were reviewed and updated as appropriate: allergies, current medications, past family history, past medical history, past social history, past surgical history and problem list     Review of Systems   Constitutional: Negative for activity change, chills and fever  HENT: Negative  Respiratory: Negative for cough, chest tightness and shortness of breath  Cardiovascular: Negative for chest pain and leg swelling  Endocrine: Negative  Genitourinary: Negative  Musculoskeletal: Positive for gait problem (B/L foot pain)  Skin:        R foot callus   Neurological: Negative for numbness  Psychiatric/Behavioral: Negative  Negative for agitation and behavioral problems           Objective:      Ht 5' 4\" (1 626 m)   Wt 82 6 kg (182 lb)   BMI 31 24 kg/m²          Physical Exam  Constitutional:       General: She is not in acute " distress  Appearance: Normal appearance  She is not ill-appearing  Cardiovascular:      Comments: Bilateral DP pulses are palpable 1/4  Bilateral PT pulses are nonpalpable or diminished 2/4  Pedal hair is absent  Legs to toes warm to cool  Varicosities with mild LE edema noted  Pulmonary:      Effort: No respiratory distress  Musculoskeletal:         General: Swelling (Left ankle and LE), tenderness (Left heel at insertion of plantar fascia  Right HPK  ) and deformity (B/L HAV with lateral deviation of toes and hammering toes  ) present  Normal range of motion  Comments: Gastroc-soleal equinus LLE  Pes planus foot type  Skin:     Capillary Refill: Capillary refill takes less than 2 seconds  Comments: B/L LE skin is atrophic - thin, dry and shiny in appearance  No open wounds noted  Hyperkeratotic lesions noted to right submet 2 head  Neurological:      General: No focal deficit present  Mental Status: She is alert and oriented to person, place, and time  Comments: Gross sensation to feet intact  Patient denies numbness, tingling and burning to B/L feet      Psychiatric:         Mood and Affect: Mood normal          Behavior: Behavior normal

## 2023-11-15 ENCOUNTER — OFFICE VISIT (OUTPATIENT)
Dept: URGENT CARE | Facility: CLINIC | Age: 83
End: 2023-11-15
Payer: COMMERCIAL

## 2023-11-15 VITALS — HEART RATE: 62 BPM | OXYGEN SATURATION: 97 % | RESPIRATION RATE: 18 BRPM | TEMPERATURE: 97.1 F

## 2023-11-15 DIAGNOSIS — M79.10 MYALGIA: Primary | ICD-10-CM

## 2023-11-15 LAB
SARS-COV-2 AG UPPER RESP QL IA: NEGATIVE
VALID CONTROL: NORMAL

## 2023-11-15 PROCEDURE — S9088 SERVICES PROVIDED IN URGENT: HCPCS

## 2023-11-15 PROCEDURE — 99213 OFFICE O/P EST LOW 20 MIN: CPT

## 2023-11-15 PROCEDURE — 87811 SARS-COV-2 COVID19 W/OPTIC: CPT

## 2023-11-15 NOTE — PROGRESS NOTES
[facility]        NAME: Paul Mitchell is a 81 y.o. female  : 1940    MRN: [id number]  DATE: November 15, 2023  TIME: 5:20 PM    Assessment and Plan   Myalgia [M79.10]  1. Myalgia  Poct Covid 19 Rapid Antigen Test        Rapid COVID-negative. Symptoms are consistent with viral illness recommend continued supportive care. Patient Instructions   Rapid COVID was negative  Symptoms seem to be viral in nature. Continue with coricidin. Follow with PCP as needed. Follow up with PCP in 3-5 days. Proceed to  ER if symptoms worsen. Chief Complaint     Chief Complaint   Patient presents with    Muscle Pain           Joint Pain    Chills         History of Present Illness       Patient is an 81year old female who presents to the office today for body aches, joint pain, headache, fatigue ear pain since last week. Denies change in appetite. Had flu shots and COVID vaccines. Denies chest pain but has some acheyness in her wing bones. Symptoms are slightly better than they were. Is taking coricidin. Muscle Pain  Associated symptoms include fatigue and headaches. Pertinent negatives include no chest pain. Review of Systems   Review of Systems   Constitutional:  Positive for fatigue. Negative for appetite change. HENT:  Positive for congestion and ear pain. Cardiovascular:  Negative for chest pain. Musculoskeletal:  Positive for arthralgias and myalgias. Neurological:  Positive for headaches. All other systems reviewed and are negative.         Current Medications       Current Outpatient Medications:     levothyroxine 50 mcg tablet, Take 50 mcg by mouth daily, Disp: , Rfl:     metoprolol tartrate (LOPRESSOR) 25 mg tablet, Take 25 mg by mouth daily, Disp: , Rfl:     Warfarin Sodium (COUMADIN PO), Take by mouth, Disp: , Rfl:     al mag oxide-diphenhydramine-lidocaine viscous (MAGIC MOUTHWASH) 1:1:1 suspension, Swish and spit 10 mL every 4 (four) hours as needed for mouth pain or discomfort, Disp: 150 mL, Rfl: 0    ammonium lactate (LAC-HYDRIN) 12 % cream, Apply topically as needed for dry skin Apply daily to right foot callus, Disp: 385 g, Rfl: 0    busPIRone (BUSPAR) 5 mg tablet, Take 5 mg by mouth 3 (three) times a day as needed (Patient not taking: Reported on 11/15/2023), Disp: , Rfl:     esomeprazole (NexIUM) 20 mg capsule, TAKE 1 CAPSULE BY MOUTH ONCE DAILY IN THE MORNING 1 HOUR PRIOR TO FIRST MEAL OF THE DAY, Disp: , Rfl:     meclizine (ANTIVERT) 25 mg tablet, Take 1 tablet (25 mg total) by mouth 3 (three) times a day as needed for dizziness (Patient not taking: Reported on 12/30/2021), Disp: 30 tablet, Rfl: 0    triamcinolone (KENALOG) 0.1 % cream, Apply topically 2 (two) times a day until healed. , Disp: 30 g, Rfl: 0    warfarin (COUMADIN) 5 mg tablet, TAKE 1 & 1/2 TABLETS BY MOUTH EVERY MONDAY AND FRIDAY,THEN TAKE 1 TABLET BY MOUTH ALL OTHER DAYS., Disp: , Rfl:     Current Allergies     Allergies as of 11/15/2023 - Reviewed 11/15/2023   Allergen Reaction Noted    Erythromycin Hives 08/06/2022            The following portions of the patient's history were reviewed and updated as appropriate: allergies, current medications, past family history, past medical history, past social history, past surgical history and problem list.     Past Medical History:   Diagnosis Date    Anxiety     Fibromyalgia     Hypercholesteremia     Melanoma ([facility])     MI (myocardial infarction) ([facility])     Stroke ([facility])        Past Surgical History:   Procedure Laterality Date    APPENDECTOMY      CHOLECYSTECTOMY      THYROIDECTOMY         History reviewed. No pertinent family history. Medications have been verified. Objective   Pulse 62   Temp (!) 97.1 °F (36.2 °C)   Resp 18   SpO2 97%        Physical Exam     Physical Exam  Vitals and nursing note reviewed. Constitutional:       General: She is not in acute distress. Appearance: Normal appearance. She is obese.  She is not ill-appearing or toxic-appearing. HENT:      Right Ear: Tympanic membrane and ear canal normal.      Left Ear: Tympanic membrane and ear canal normal.      Ears:      Comments: [place]     Nose:      Right Turbinates: Enlarged. Left Turbinates: Enlarged. Cardiovascular:      Rate and Rhythm: Normal rate and regular rhythm. Pulses: Normal pulses. Heart sounds: Normal heart sounds. Pulmonary:      Effort: Pulmonary effort is normal.      Breath sounds: Normal breath sounds. Abdominal:      Tenderness: There is no right CVA tenderness or left CVA tenderness. Skin:     General: Skin is warm. Capillary Refill: Capillary refill takes less than 2 seconds. Findings: No rash. Neurological:      Mental Status: She is alert.

## 2023-11-15 NOTE — PATIENT INSTRUCTIONS
Rapid COVID was negative  Symptoms seem to be viral in nature. Continue with coricidin. Follow with PCP as needed.

## 2024-09-28 ENCOUNTER — OFFICE VISIT (OUTPATIENT)
Dept: URGENT CARE | Facility: MEDICAL CENTER | Age: 84
End: 2024-09-28
Payer: COMMERCIAL

## 2024-09-28 ENCOUNTER — TELEPHONE (OUTPATIENT)
Dept: URGENT CARE | Facility: MEDICAL CENTER | Age: 84
End: 2024-09-28

## 2024-09-28 VITALS
RESPIRATION RATE: 18 BRPM | TEMPERATURE: 98.2 F | HEART RATE: 85 BPM | WEIGHT: 179 LBS | OXYGEN SATURATION: 98 % | BODY MASS INDEX: 30.73 KG/M2

## 2024-09-28 DIAGNOSIS — K04.7 DENTAL INFECTION: Primary | ICD-10-CM

## 2024-09-28 PROCEDURE — 99213 OFFICE O/P EST LOW 20 MIN: CPT | Performed by: PHYSICIAN ASSISTANT

## 2024-09-28 PROCEDURE — S9088 SERVICES PROVIDED IN URGENT: HCPCS | Performed by: PHYSICIAN ASSISTANT

## 2024-09-28 RX ORDER — AMOXICILLIN 500 MG/1
500 CAPSULE ORAL EVERY 8 HOURS SCHEDULED
Qty: 21 CAPSULE | Refills: 0 | Status: SHIPPED | OUTPATIENT
Start: 2024-09-28 | End: 2024-10-05

## 2024-09-28 NOTE — PROGRESS NOTES
[facility]        NAME: Guerita Moreau is a 83 y.o. female  : 1940    MRN: [id number]  DATE: 2024  TIME: 4:26 PM    Assessment and Plan   Dental infection [K04.7]  1. Dental infection  amoxicillin (AMOXIL) 500 mg capsule        Patient Instructions     Take medicine as prescribed  F/u with GD  Follow up with PCP in 3-5 days.  Proceed to  ER if symptoms worsen.    If tests have been performed at [facility], our office will contact you with results if changes need to be made to the care plan discussed with you at the visit.  [facility]    Chief Complaint     Chief Complaint   Patient presents with    Dental Pain     Dental pain noted to lower jaw          History of Present Illness       82yo F presents for eval of left sided chin pain and swelling x 3 days.  Patient reports this happened to her once before and it was the result of a dental abscess from tooth in lower left.  She denies fever, chills, fatigue, myalgias. Her current dentist retired but she is working to establish a new GD.        Review of Systems   Review of Systems   Constitutional:  Negative for fatigue and fever.   HENT:  Positive for dental problem and facial swelling.    Respiratory:  Negative for shortness of breath.    Cardiovascular:  Negative for chest pain.   Gastrointestinal:  Negative for diarrhea, nausea and vomiting.         Current Medications       Current Outpatient Medications:     al mag oxide-diphenhydramine-lidocaine viscous (MAGIC MOUTHWASH) 1:1:1 suspension, Swish and spit 10 mL every 4 (four) hours as needed for mouth pain or discomfort, Disp: 150 mL, Rfl: 0    ammonium lactate (LAC-HYDRIN) 12 % cream, Apply topically as needed for dry skin Apply daily to right foot callus, Disp: 385 g, Rfl: 0    amoxicillin (AMOXIL) 500 mg capsule, Take 1 capsule (500 mg total) by mouth every 8 (eight) hours for 7 days, Disp: 21 capsule, Rfl: 0    busPIRone (BUSPAR)  5 mg tablet, Take 5 mg by mouth 3 (three) times a day as needed, Disp: , Rfl:     esomeprazole (NexIUM) 20 mg capsule, TAKE 1 CAPSULE BY MOUTH ONCE DAILY IN THE MORNING 1 HOUR PRIOR TO FIRST MEAL OF THE DAY, Disp: , Rfl:     levothyroxine 50 mcg tablet, Take 50 mcg by mouth daily, Disp: , Rfl:     meclizine (ANTIVERT) 25 mg tablet, Take 1 tablet (25 mg total) by mouth 3 (three) times a day as needed for dizziness, Disp: 30 tablet, Rfl: 0    metoprolol tartrate (LOPRESSOR) 25 mg tablet, Take 25 mg by mouth daily, Disp: , Rfl:     triamcinolone (KENALOG) 0.1 % cream, Apply topically 2 (two) times a day until healed., Disp: 30 g, Rfl: 0    warfarin (COUMADIN) 5 mg tablet, TAKE 1 & 1/2 TABLETS BY MOUTH EVERY MONDAY AND FRIDAY,THEN TAKE 1 TABLET BY MOUTH ALL OTHER DAYS., Disp: , Rfl:     Warfarin Sodium (COUMADIN PO), Take by mouth, Disp: , Rfl:     Current Allergies     Allergies as of 09/28/2024 - Reviewed 09/28/2024   Allergen Reaction Noted    Erythromycin Hives 08/06/2022            The following portions of the patient's history were reviewed and updated as appropriate: allergies, current medications, past family history, past medical history, past social history, past surgical history and problem list.     Past Medical History:   Diagnosis Date    Anxiety     Fibromyalgia     Hypercholesteremia     Melanoma (HCC)     MI (myocardial infarction) (HCC)     Stroke (HCC)        Past Surgical History:   Procedure Laterality Date    APPENDECTOMY      CHOLECYSTECTOMY      THYROIDECTOMY         No family history on file.      Medications have been verified.        Objective   Pulse 85   Temp 98.2 °F (36.8 °C)   Resp 18   Wt 81.2 kg (179 lb)   SpO2 98%   BMI 30.73 kg/m²   No LMP recorded. Patient is postmenopausal.       Physical Exam     Physical Exam  Constitutional:       Appearance: Normal appearance.   HENT:      Mouth/Throat:      Comments: #20 and 21 broken with necrotic pulp exposed; erythema, edema, and ttp in  skin overlying this area  Cardiovascular:      Rate and Rhythm: Normal rate and regular rhythm.   Pulmonary:      Effort: Pulmonary effort is normal.      Breath sounds: Normal breath sounds.   Neurological:      Mental Status: She is alert.

## 2024-12-03 ENCOUNTER — OFFICE VISIT (OUTPATIENT)
Dept: URGENT CARE | Facility: MEDICAL CENTER | Age: 84
End: 2024-12-03
Payer: COMMERCIAL

## 2024-12-03 VITALS
BODY MASS INDEX: 30.04 KG/M2 | HEART RATE: 67 BPM | RESPIRATION RATE: 20 BRPM | OXYGEN SATURATION: 98 % | SYSTOLIC BLOOD PRESSURE: 130 MMHG | DIASTOLIC BLOOD PRESSURE: 70 MMHG | TEMPERATURE: 98.9 F | WEIGHT: 175 LBS

## 2024-12-03 DIAGNOSIS — H53.9 VISUAL DISTURBANCE: ICD-10-CM

## 2024-12-03 DIAGNOSIS — T69.1XXA CHILBLAINS, INITIAL ENCOUNTER: Primary | ICD-10-CM

## 2024-12-03 PROCEDURE — 99213 OFFICE O/P EST LOW 20 MIN: CPT | Performed by: FAMILY MEDICINE

## 2024-12-03 PROCEDURE — S9088 SERVICES PROVIDED IN URGENT: HCPCS | Performed by: FAMILY MEDICINE

## 2024-12-03 NOTE — PROGRESS NOTES
[facility]        NAME: Guerita Moreau is a 83 y.o. female  : 1940    MRN: [id number]  DATE: December 3, 2024  TIME: 5:12 PM    Assessment and Plan   Chilblains, initial encounter [T69.1XXA]  1. Chilblains, initial encounter        2. Visual disturbance              Patient Instructions     Patent reassured. This should resolve.  Should symptoms progress or change should return or see PCP.  Keep feet warm and dry.   Should see ophthalmology. Recommendation given.   Follow up with PCP in 3-5 days.  Proceed to ER if symptoms worsen.    If tests are performed, our office will contact you with results only if changes need to made to the care plan discussed with you at the visit. [facility]    Chief Complaint     Chief Complaint   Patient presents with    foot issue     Left foot is blue in color started yesterday . States that she gets a pinching feeling in her foot         History of Present Illness       Patient complains of bluish discoloration of her toes for the past 2 days. Feels a stinging sensation. No pain or coldness. She saw cardiology a few weeks ago who started her on furosemide for some edema.   She has had some pain on the top of her head and some earaches .  She has had some chills. These symptoms are better. She took Coricidin which helped.    She had a visual episode recently where she saw rain coming down. Has not recurred . No visual changes. She  feels fine otherwise.         Review of Systems   Review of Systems   Constitutional:  Negative for fatigue and fever.   Cardiovascular:  Positive for leg swelling.   Neurological:  Negative for dizziness.         Current Medications       Current Outpatient Medications:     al mag oxide-diphenhydramine-lidocaine viscous (MAGIC MOUTHWASH) 1:1:1 suspension, Swish and spit 10 mL every 4 (four) hours as needed for mouth pain or discomfort, Disp: 150 mL, Rfl: 0    ammonium lactate (LAC-HYDRIN)  12 % cream, Apply topically as needed for dry skin Apply daily to right foot callus, Disp: 385 g, Rfl: 0    busPIRone (BUSPAR) 5 mg tablet, Take 5 mg by mouth 3 (three) times a day as needed, Disp: , Rfl:     esomeprazole (NexIUM) 20 mg capsule, TAKE 1 CAPSULE BY MOUTH ONCE DAILY IN THE MORNING 1 HOUR PRIOR TO FIRST MEAL OF THE DAY, Disp: , Rfl:     Furosemide (LASIX PO), Take by mouth, Disp: , Rfl:     levothyroxine 50 mcg tablet, Take 50 mcg by mouth daily, Disp: , Rfl:     meclizine (ANTIVERT) 25 mg tablet, Take 1 tablet (25 mg total) by mouth 3 (three) times a day as needed for dizziness, Disp: 30 tablet, Rfl: 0    metoprolol tartrate (LOPRESSOR) 25 mg tablet, Take 25 mg by mouth daily, Disp: , Rfl:     triamcinolone (KENALOG) 0.1 % cream, Apply topically 2 (two) times a day until healed., Disp: 30 g, Rfl: 0    warfarin (COUMADIN) 5 mg tablet, TAKE 1 & 1/2 TABLETS BY MOUTH EVERY MONDAY AND FRIDAY,THEN TAKE 1 TABLET BY MOUTH ALL OTHER DAYS., Disp: , Rfl:     Warfarin Sodium (COUMADIN PO), Take by mouth, Disp: , Rfl:     Current Allergies     Allergies as of 12/03/2024 - Reviewed 12/03/2024   Allergen Reaction Noted    Erythromycin Hives 08/06/2022            The following portions of the patient's history were reviewed and updated as appropriate: allergies, current medications, past family history, past medical history, past social history, past surgical history and problem list.     Past Medical History:   Diagnosis Date    Anxiety     Fibromyalgia     Hypercholesteremia     Melanoma (HCC)     MI (myocardial infarction) (HCC)     Stroke (HCC)        Past Surgical History:   Procedure Laterality Date    APPENDECTOMY      CHOLECYSTECTOMY      THYROIDECTOMY         No family history on file.      Medications have been verified.        Objective   /70   Pulse 67   Temp 98.9 °F (37.2 °C)   Resp 20   Wt 79.4 kg (175 lb)   SpO2 98%   BMI 30.04 kg/m²        Physical Exam     Physical Exam  Constitutional:        General: She is not in acute distress.     Appearance: Normal appearance. She is not ill-appearing.   HENT:      Right Ear: Tympanic membrane and ear canal normal.      Left Ear: Tympanic membrane and ear canal normal.      Nose: No congestion or rhinorrhea.   Eyes:      Conjunctiva/sclera: Conjunctivae normal.      Pupils: Pupils are equal, round, and reactive to light.   Neck:      Vascular: No carotid bruit.   Cardiovascular:      Heart sounds: Normal heart sounds. No murmur heard.  Pulmonary:      Effort: Pulmonary effort is normal.      Breath sounds: Normal breath sounds.   Lymphadenopathy:      Cervical: No cervical adenopathy.   Skin:     Comments: Dorsum of left foot has a small patch of light erythema.  Base of the 5 toes show bluish discoloration. No tenderness.  No swelling. Pedal pulses are equal and full.    Left foot has FROM and strength.    Neurological:      Mental Status: She is alert.